# Patient Record
Sex: FEMALE | Race: WHITE | NOT HISPANIC OR LATINO | Employment: PART TIME | URBAN - METROPOLITAN AREA
[De-identification: names, ages, dates, MRNs, and addresses within clinical notes are randomized per-mention and may not be internally consistent; named-entity substitution may affect disease eponyms.]

---

## 2019-01-18 ENCOUNTER — HOSPITAL ENCOUNTER (OUTPATIENT)
Dept: MAMMOGRAPHY | Facility: CLINIC | Age: 49
Discharge: HOME | End: 2019-01-18
Attending: FAMILY MEDICINE | Admitting: FAMILY MEDICINE
Payer: COMMERCIAL

## 2019-01-18 DIAGNOSIS — N63.10 BREAST MASS, RIGHT: ICD-10-CM

## 2019-01-18 PROCEDURE — 76642 ULTRASOUND BREAST LIMITED: CPT | Mod: RT

## 2019-01-25 ENCOUNTER — HOSPITAL ENCOUNTER (OUTPATIENT)
Dept: MAMMOGRAPHY | Facility: CLINIC | Age: 49
Discharge: HOME | End: 2019-01-25
Attending: FAMILY MEDICINE | Admitting: FAMILY MEDICINE
Payer: COMMERCIAL

## 2019-01-25 DIAGNOSIS — N63.10 BREAST MASS, RIGHT: ICD-10-CM

## 2019-01-25 PROCEDURE — 88305 TISSUE EXAM BY PATHOLOGIST: CPT | Performed by: OBSTETRICS & GYNECOLOGY

## 2019-01-25 PROCEDURE — 27210206 US BREAST BIOPSY CORE NEEDLE RIGHT

## 2019-01-25 PROCEDURE — 88305 TISSUE EXAM BY PATHOLOGIST: CPT | Mod: 26 | Performed by: OBSTETRICS & GYNECOLOGY

## 2019-01-25 PROCEDURE — 25000125 ZZHC RX 250: Performed by: RADIOLOGY

## 2019-01-25 RX ADMIN — LIDOCAINE HYDROCHLORIDE 5 ML: 10 INJECTION, SOLUTION INFILTRATION; PERINEURAL at 11:24

## 2019-01-25 NOTE — DISCHARGE INSTRUCTIONS

## 2019-01-28 ENCOUNTER — TELEPHONE (OUTPATIENT)
Dept: MAMMOGRAPHY | Facility: CLINIC | Age: 49
End: 2019-01-28

## 2019-01-28 LAB — COPATH REPORT: NORMAL

## 2019-01-28 NOTE — PROGRESS NOTES
Tag Copy       Pathology report reviewed with our breast radiologist Dr. Chip Perla.     I phoned and informed patient of results showing benign lymph node.  Recommended follow up is Clinical Follow up.  I informed patient I would send this note to her primary provider/ordering physician- Dr. Carolina Sinha to inform.     Patient states no problems with biopsy site. Questions were answered and my phone number given if she has further questions or concerns.  Lyssa Elizalde RN, BSN

## 2019-01-28 NOTE — TELEPHONE ENCOUNTER
Pathology report reviewed with our breast radiologist Dr. Chip Perla.    I phoned and informed patient of results showing benign lymph node.  Recommended follow up is Clinical Follow up.  I informed patient I would send this note to her primary provider/ordering physician- Dr. Carolina Sinha to inform.    Patient states no problems with biopsy site. Questions were answered and my phone number given if she has further questions or concerns.  Lyssa Elizalde RN, BSN

## 2020-04-29 ENCOUNTER — HOSPITAL PATHOLOGY (OUTPATIENT)
Dept: OTHER | Facility: CLINIC | Age: 50
End: 2020-04-29

## 2020-04-30 LAB — COPATH REPORT: NORMAL

## 2020-07-20 DIAGNOSIS — Z11.59 ENCOUNTER FOR SCREENING FOR OTHER VIRAL DISEASES: Primary | ICD-10-CM

## 2020-10-25 DIAGNOSIS — Z11.59 ENCOUNTER FOR SCREENING FOR OTHER VIRAL DISEASES: ICD-10-CM

## 2020-10-25 PROCEDURE — U0003 INFECTIOUS AGENT DETECTION BY NUCLEIC ACID (DNA OR RNA); SEVERE ACUTE RESPIRATORY SYNDROME CORONAVIRUS 2 (SARS-COV-2) (CORONAVIRUS DISEASE [COVID-19]), AMPLIFIED PROBE TECHNIQUE, MAKING USE OF HIGH THROUGHPUT TECHNOLOGIES AS DESCRIBED BY CMS-2020-01-R: HCPCS | Performed by: COLON & RECTAL SURGERY

## 2020-10-26 LAB
SARS-COV-2 RNA SPEC QL NAA+PROBE: NOT DETECTED
SPECIMEN SOURCE: NORMAL

## 2020-10-28 ENCOUNTER — HOSPITAL ENCOUNTER (OUTPATIENT)
Facility: CLINIC | Age: 50
Discharge: HOME | End: 2020-10-28
Attending: COLON & RECTAL SURGERY | Admitting: COLON & RECTAL SURGERY
Payer: COMMERCIAL

## 2020-10-28 VITALS
TEMPERATURE: 98.1 F | DIASTOLIC BLOOD PRESSURE: 96 MMHG | WEIGHT: 135 LBS | OXYGEN SATURATION: 98 % | BODY MASS INDEX: 20.46 KG/M2 | HEART RATE: 62 BPM | HEIGHT: 68 IN | SYSTOLIC BLOOD PRESSURE: 130 MMHG | RESPIRATION RATE: 15 BRPM

## 2020-10-28 PROBLEM — H93.12 TINNITUS OF LEFT EAR: Status: ACTIVE | Noted: 2018-05-22

## 2020-10-28 LAB — COLONOSCOPY: NORMAL

## 2020-10-28 PROCEDURE — 45378 DIAGNOSTIC COLONOSCOPY: CPT | Performed by: COLON & RECTAL SURGERY

## 2020-10-28 PROCEDURE — G0105 COLORECTAL SCRN; HI RISK IND: HCPCS | Performed by: COLON & RECTAL SURGERY

## 2020-10-28 PROCEDURE — 250N000011 HC RX IP 250 OP 636: Performed by: COLON & RECTAL SURGERY

## 2020-10-28 PROCEDURE — G0500 MOD SEDAT ENDO SERVICE >5YRS: HCPCS | Performed by: COLON & RECTAL SURGERY

## 2020-10-28 RX ORDER — PROCHLORPERAZINE MALEATE 10 MG
10 TABLET ORAL EVERY 6 HOURS PRN
Status: DISCONTINUED | OUTPATIENT
Start: 2020-10-28 | End: 2020-10-28 | Stop reason: HOSPADM

## 2020-10-28 RX ORDER — FENTANYL CITRATE 50 UG/ML
INJECTION, SOLUTION INTRAMUSCULAR; INTRAVENOUS PRN
Status: DISCONTINUED | OUTPATIENT
Start: 2020-10-28 | End: 2020-10-28 | Stop reason: HOSPADM

## 2020-10-28 RX ORDER — ONDANSETRON 4 MG/1
4 TABLET, ORALLY DISINTEGRATING ORAL EVERY 6 HOURS PRN
Status: DISCONTINUED | OUTPATIENT
Start: 2020-10-28 | End: 2020-10-28 | Stop reason: HOSPADM

## 2020-10-28 RX ORDER — FLUMAZENIL 0.1 MG/ML
0.2 INJECTION, SOLUTION INTRAVENOUS
Status: DISCONTINUED | OUTPATIENT
Start: 2020-10-28 | End: 2020-10-28 | Stop reason: HOSPADM

## 2020-10-28 RX ORDER — ONDANSETRON 2 MG/ML
4 INJECTION INTRAMUSCULAR; INTRAVENOUS EVERY 6 HOURS PRN
Status: DISCONTINUED | OUTPATIENT
Start: 2020-10-28 | End: 2020-10-28 | Stop reason: HOSPADM

## 2020-10-28 RX ORDER — BUPROPION HYDROCHLORIDE 150 MG/1
150 TABLET ORAL EVERY MORNING
COMMUNITY

## 2020-10-28 RX ORDER — NALOXONE HYDROCHLORIDE 0.4 MG/ML
.1-.4 INJECTION, SOLUTION INTRAMUSCULAR; INTRAVENOUS; SUBCUTANEOUS
Status: DISCONTINUED | OUTPATIENT
Start: 2020-10-28 | End: 2020-10-28 | Stop reason: HOSPADM

## 2020-10-28 RX ORDER — ONDANSETRON 2 MG/ML
4 INJECTION INTRAMUSCULAR; INTRAVENOUS
Status: DISCONTINUED | OUTPATIENT
Start: 2020-10-28 | End: 2020-10-28 | Stop reason: HOSPADM

## 2020-10-28 RX ORDER — LIDOCAINE 40 MG/G
CREAM TOPICAL
Status: DISCONTINUED | OUTPATIENT
Start: 2020-10-28 | End: 2020-10-28 | Stop reason: HOSPADM

## 2020-10-28 ASSESSMENT — MIFFLIN-ST. JEOR: SCORE: 1285.86

## 2020-10-28 NOTE — BRIEF OP NOTE
[facility]    Brief Operative Note    Pre-operative diagnosis: Family history of malignant neoplasm [Z80.9]  Post-operative diagnosis normal colon    Procedure: Procedure(s):  COLONOSCOPY  Surgeon: Surgeon(s) and Role:     * Shy Franco MD - Primary  Anesthesia: Conscious Sedation   Estimated blood loss: None  Drains: None  Specimens: * No specimens in log *  Findings:   See Provation procedure note in Epic    Complications: None.  Implants: * No implants in log *

## 2020-10-28 NOTE — H&P
Pre-Endoscopy History and Physical     Lori Harris MRN# [id number]   YOB: 1970 Age: 50 year old     Date of Procedure: 10/28/2020  Primary care provider: Carolina Sinha  Type of Endoscopy: colonoscopy  Reason for Procedure: screening  Type of Anesthesia Anticipated: moderate sedation    HPI:    Lori is a 50 year old female who will be undergoing the above procedure.  Patient has noted smaller and more frequent stools lately. Denies bleeding.     A history and physical has been performed. The patient's medications and allergies have been reviewed. The risks and benefits of the procedure and the sedation options and risks were discussed with the patient.  All questions were answered and informed consent was obtained.      She denies a personal or family history of anesthesia complications or bleeding disorders.   Prior to Admission medications    Medication Sig Start Date End Date Taking? Authorizing Provider   buPROPion (WELLBUTRIN XL) 150 MG 24 hr tablet Take 150 mg by mouth every morning   Yes Reported, Patient   Cholecalciferol (VITAMIN D PO) Take 1 tablet by mouth daily.   Yes Reported, Patient   Cyanocobalamin (VITAMIN B 12 PO) Take 1 tablet by mouth daily.   Yes Reported, Patient   levothyroxine (SYNTHROID, LEVOTHROID) 75 MCG tablet Take 1 tablet by mouth daily.   Yes Reported, Patient   MULTIPLE VITAMIN PO Take 1 tablet by mouth daily.   Yes Reported, Patient   Naratriptan HCl (AMERGE PO) Take 1 mg by mouth every 4 hours as needed for migraine   Yes Reported, Patient       Allergies   Allergen Reactions     No Known Drug Allergy         Current Facility-Administered Medications   Medication     lidocaine (LMX4) cream     lidocaine 1 % 0.1-1 mL     ondansetron (ZOFRAN) injection 4 mg     sodium chloride (PF) 0.9% PF flush 3 mL     sodium chloride (PF) 0.9% PF flush 3 mL       Patient Active Problem List   Diagnosis     Transplant donor evaluation     Hypothyroidism     Acquired  absence of breast     Breast cancer (H)     Depressive disorder     FH: colon cancer     First degree atrioventricular block     Headache     Iliotibial band syndrome     Migraine without aura     Other extrapyramidal disease and abnormal movement disorder     Palpitations     Tinnitus of left ear     Seasonal affective disorder (H)     Persistent disorder of initiating or maintaining sleep        Past Medical History:   Diagnosis Date     Depressive disorder, not elsewhere classified      First degree atrioventricular block      Headaches      Hypothyroidism      Irregular heart beat      Malignant neoplasm of breast (female), unspecified site      Other disorder of muscle, ligament, and fascia      Persistent disorder of initiating or maintaining sleep         Past Surgical History:   Procedure Laterality Date     COLONOSCOPY N/A 9/23/2015    Procedure: COLONOSCOPY;  Surgeon: Shy Franco MD;  [facility]     DISSECT LYMPH NODE AXILLA Bilateral 8/25/2014    Procedure: DISSECT LYMPH NODE AXILLA;  Surgeon: Renetta Delatorre MD;  Location: [facility]     EXCISE CYST THYROGLOSSAL DUCT  1988     FLUORESCENCE INTRAOPERATIVE VASCULAR ANGIOGRAPHY Bilateral 8/25/2014    Procedure: FLUORESCENCE INTRAOPERATIVE VASCULAR ANGIOGRAPHY;  Surgeon: Jyoti Richardson MD;  Location: [facility]     GRAFT FAT TO BREAST N/A 1/6/2015    Procedure: GRAFT FAT TO BREAST;  Surgeon: Jyoti Richardson MD;  Location: [facility]     INSERT TISSUE EXPANDER BREAST BILATERAL Bilateral 8/25/2014    Procedure: INSERT TISSUE EXPANDER BREAST BILATERAL;  Surgeon: Jyoti Richardson MD;  Location: [facility]     MASTECTOMY, NIPPLE SPARING Bilateral 8/25/2014    Procedure: MASTECTOMY, NIPPLE SPARING;  Surgeon: Renetta Delatorre MD;  Location: [facility]     RECONSTRUCT BREAST BILATERAL, IMPLANT PROSTHESIS BILATERAL, COMBINED Bilateral 1/6/2015    Procedure: COMBINED RECONSTRUCT BREAST BILATERAL, IMPLANT PROSTHESIS BILATERAL;  Surgeon: Dylan  "Jyoti Guerra MD;  Location: [facility]       Social History     Tobacco Use     Smoking status: Never Smoker     Smokeless tobacco: Never Used   Substance Use Topics     Alcohol use: Yes     Comment: three to four drinks per week       Family History   Problem Relation Age of Onset     C.A.D. Father      Prostate Cancer Father      Breast Cancer Maternal Grandmother      Colon Cancer Mother        REVIEW OF SYSTEMS:     5 point ROS negative except as noted above in HPI, including Gen., Resp., CV, GI &  system review.      PHYSICAL EXAM:   BP (!) 140/100   Pulse 69   Temp 98.1  F (36.7  C) (Oral)   Resp 18   Ht 1.727 m (5' 8\")   Wt 61.2 kg (135 lb)   SpO2 98%   BMI 20.53 kg/m   Estimated body mass index is 20.53 kg/m  as calculated from the following:    Height as of this encounter: 1.727 m (5' 8\").    Weight as of this encounter: 61.2 kg (135 lb).   GENERAL APPEARANCE: healthy  MENTAL STATUS: alert  AIRWAY EXAM: Mallampatti Class II (visualization of the soft palate, fauces, and uvula)  RESP: lungs clear to auscultation - no rales, rhonchi or wheezes  CV: regular rates and rhythm      DIAGNOSTICS:    Not indicated      IMPRESSION   ASA Class 2 - Mild systemic disease        PLAN:     Colonoscopy with possible polypectomy, possible biopsy. The indications, procedure and risks were explained to the patient who agrees to proceed.       The above has been forwarded to the consulting provider.      Signed Electronically by: Shy Franco MD  October 28, 2020          "

## 2021-04-08 ENCOUNTER — IMMUNIZATION (OUTPATIENT)
Dept: LAB | Facility: CLINIC | Age: 51
End: 2021-04-08
Payer: COMMERCIAL

## 2021-05-09 ENCOUNTER — HEALTH MAINTENANCE LETTER (OUTPATIENT)
Age: 51
End: 2021-05-09

## 2021-10-24 ENCOUNTER — HEALTH MAINTENANCE LETTER (OUTPATIENT)
Age: 51
End: 2021-10-24

## 2021-10-27 ENCOUNTER — TRANSFERRED RECORDS (OUTPATIENT)
Dept: HEALTH INFORMATION MANAGEMENT | Facility: CLINIC | Age: 51
End: 2021-10-27

## 2022-05-06 ENCOUNTER — TELEPHONE (OUTPATIENT)
Dept: TRANSPLANT | Facility: CLINIC | Age: 52
End: 2022-05-06

## 2022-05-06 NOTE — TELEPHONE ENCOUNTER
"SSN: [id number]  Voucher: Opted-In Registered As: Family Voucher Donor  Donor Intake Start: 22 Donor Intake Complete: 5/3/22  Gender: Female Preferred Language: English  Full Name: Lori Harris Is  Needed: [not answered]  E-mail: [email or web address] Phone Number: [phone]  Secondary Phone:  Contact Preference: [not answered] Best Contact Time: 9am - 5pm  Emergency Contact: Kenneth Harris Emergency Contact #: [phone]  Relationship to Contact: Contact is my spouse  : 72 Age: 52  Address: [facility] [address] City: [place]  State: [place] Postal Code: [id number]  Height: 5'8\" Weight: 140lbs  BMI: 21.3  Employment Status: Employed Has PTO for donation? Yes, using vacation  Occupation:  Requires Heavy Lifting? No  Education Level: Four Year Degree Marital Status:   Exercise Routine: 4-6/Week Health Insurance: Yes  Blood Type: O Ethnicity/Race: White  Donor Type: Family Voucher Donor  Prefer Remote Donation: [not answered]  Physician: Dr Carolina Sinha<br/>RODRIGUEZ Hernandez  Motivation to donate: I ve have thought about it for a long time and recently heard a story about altruistic donation on  [facility] which renewed my desire for moving forward.  Living Donor Pre-Screening  Is In [place]  Will accept blood transfusions? Yes  Has been diagnosed with kidney disease? No  Has had a heart attack? No  Has Diabetes (High BGs)? No  Has had cancer? Yes  Type Remission  Breast Cancer 7 years  Has had kidney stones? No  Has ever been pregnant? Yes   - Is Currently Pregnant? No   - Months Since Pregnant? 24+   - Is Currently Nursing? No   - Had gestational diabetes? No   - Hypertension during pregnancy? Never  Is Planning on Pregnancy? No  Is Taking Birth Control? Yes   - Birth Control Months? 25   - BirthControlForm? IUD   - Birth Control Complications? No   - Is Able To Stop Birth Control? Yes  Has Used Tobacco? No  Has HIV? No  Is Currently Incarcerated? No  Is Currently Residing in [place] " Yes  History Misc  Has Allergies? No  Has had Surgeries? Yes  Surgery When  Bilateral mastectomy 2014  Breast reconstruction 2015  Thyroglossal duct cyst removal 1988  Takes Medication? Yes  Medication Dose Frequency  Diphenhydramine 25 mg Daily  Bupropion 150mg Daily  Ibuprofen, Naproxen As needed  Medical History  History of High BP? Never  History of CABG (bypass surgery)? No  History of blood clots? Never  History of coronary disease? Never  History of high cholesterol or triglycerides? Never  Has stents implanted? No  History of chest pain during exercise? No  History of chest pain at other times? No  Results of climbing 2 flights of stairs? No Problem  Had stress test in last year? No  Has had stroke? No  Has had leg bypass? No  History of lung disease? Never  History of COPD? Never  History of TB? Never  History of Pneumonia? Never  Has respiratory issues? No  Has HIV? No  Has Gastro Issues? No  History of Gallstones? Never  History of Pancreatitis? Never  History of Liver Disease? Never  History of Hepatitis B? Never  History of Hepatitis C? Never  History of bleeding problems? Never  History of UTIs? No  History of kidney damage? Never  History of Proteinuria? Never  History of Hematuria? Never  History of neuro disease? Never  History of seizure? Never  History of lupus? Never  History of paralysis? Never  History of arthritis? Never  History of neuropathy? Never  History of depression? Still being treated  History of anxiety? Never  History of documented psychiatric illness? Never  History of Fibroid Uterus? Never  History of Endometriosis? Never  History of Polycystic Ovaries? Never  Has had Miscarriages? No  Has had abortions? No  Has had transfusions? No  History of Obesity? No  History of Fabry's Disease? No  History of Sickle Cell Disease? No  History of Sickle Cell Trait? No  History of Sarcoidosis? No  Has auto-immune disease? No  Has had Physical Exam? Yes   - how many years ago: 1  Has had  Mammogram? Yes   - how many years ago: 7  Has had Pap Smear? Yes   - how many years ago: 1  Colonoscopy? Yes   - how many years ago: 2  Medical history comments? [no comments]  Living Donor Family Medical History  Anyone with kidney disease? No  Anyone with liver disease? Yes   - which family members: Mother  Anyone with heart disease? Yes   - which family members: Father, fraternal grandmother  Anyone with coronary artery disease? No  Anyone with high blood pressure? No  Anyone with blood disorder? No  Anyone with cancer? Yes   - which family members: Mother, maternal grandmother  Anyone with kidney cancer? No  Anyone with diabetes? No  Is mother alive? Yes  Mother's age? 79  Is father alive? Yes  Father's age? 79  How many siblings? 1  How many adult children? 2  How many children under 18? 0  Social History  Has Used Alcohol? Yes   - currently uses alcohol: Yes   - how much: 1/Daily  Has Abused Alcohol? No  Has Used Drugs? No  Has had legal issues w/ law enforcement? No  Traveled over 100 miles from home in last year? Yes   - Traveled Where? [place], [place]. [place]  Has had suicidal thoughts or attempts in the last five years? No

## 2022-05-09 ENCOUNTER — TELEPHONE (OUTPATIENT)
Dept: TRANSPLANT | Facility: CLINIC | Age: 52
End: 2022-05-09
Payer: COMMERCIAL

## 2022-05-09 NOTE — TELEPHONE ENCOUNTER
Initial Independent Living Donor Advocate contact made with potential donor today.  I introduced myself and my role during the donation process, includin.  REBA ROLE   The federal government requires that all licensed transplant centers provide the living donor with an Independent Living Donor Advocate (REBA).  I do not meet recipients or attend meetings that discuss their care or decision to transplant them. My role is separate to avoid any conflict of interest.  My role is to ensure:  1) your rights are protected;  2) you get all the information you need from the transplant team to make a fully informed decision whether to donate;   3) that living donation is in your best interest.   4) that you have the right to decide NOT to go forward with living donation at any time during this process.  I am available to you throughout the workup, during surgery phase and follow-up at home.   2. WORKUP & PRIVACY     Your identity and workup are not shared with the recipient at any time.     There is a medical donor workup that consists of testing to determine if you are healthy enough to donate.  Workup tests include many blood draws, urine collection/ (kidney function testing), chest x-ray, EKG, CT scan. As you complete each step then you may move on to the next.  Workup can take as little or as long as you need and you can stop the process at any time.     Transplant is a treatment option, not a cure. A kidney from a living kidney donor can last 12-14 years.  Other treatment options are  donation and two types of dialysis.     This is major surgery and your estimated hospital stay is approximately 1-2 nights.  After surgery, there are driving and lifting restrictions - no driving for two weeks and no lifting over ten pounds for 6 - 8 weeks.  Donors are routinely off from work for 4 - 6 weeks after surgery, and potentially longer if they have a physical job.       If you anticipate lost wages due to donation,  donor wage reimbursement options may be available to you and will be reviewed with you during the evaluation process.      The recipient's insurance covers the medical expenses related to the donor evaluation and surgery.  However, it is important for you to carry your own health insurance to address any medical issues that are found and are NOT related to living donation.  3.  QUESTIONS  Have you received a packet from the transplant department?     Questions?    Have you discussed with anyone your potential decision to donate?   Yes.  Is anyone pressuring or coercing you to donate? No.  Have you discussed any financial arrangements with recipient around donating a kidney? NO  Are you aware that you can confidentially opt out at any time, up to and including day of donation? Yes.  At this time, would you like to proceed with the medical evaluation to see if you can be a kidney donor?  Yes.    If yes, the donor coordinator will be reaching out to you with next steps.     You can reach me or someone else on the REBA team by calling [phone] Option 3.    REBA NOTES: She went through our process 9 years ago in 2013 and all the notes are in our system.  She ended up not donating at that time because her kids were still young and she and her  jointly decided to wait.  She wants to be a NDD.  Her coordinator is Soha Jackson RN.  I let her know that Soha will be reaching out in 2 to 3 days.    Duration of call 30 minutes

## 2022-05-10 ENCOUNTER — TELEPHONE (OUTPATIENT)
Dept: TRANSPLANT | Facility: CLINIC | Age: 52
End: 2022-05-10
Payer: COMMERCIAL

## 2022-05-13 ENCOUNTER — TELEPHONE (OUTPATIENT)
Dept: TRANSPLANT | Facility: CLINIC | Age: 52
End: 2022-05-13
Payer: COMMERCIAL

## 2022-05-13 DIAGNOSIS — Z00.5 EXAMINATION OF POTENTIAL DONOR OF ORGAN AND TISSUE: Primary | ICD-10-CM

## 2022-05-13 NOTE — TELEPHONE ENCOUNTER
I called Lori Soyzeeshan today to provide education on living kidney donation.  I introduced myself and described my role as the patient's Transplant Coordinator. The format for our call was via Teams meeting.  The patient has offered to be a nondirect donor.  The patient states she has been thinking about being a NDD for several years.  In fact she was evaluated at our center in 2013 but chose to wait due to her children and family needs.  She heard a [facility] story a few weeks ago that renewed her interest in donation.    Medical/Surgical History:  The patient states is a Non-Smoker.  Medical History includes:  1.  Ductal Carcinoma In -Situ Stage 0, 2014 with Bilateral mastectomy, and subsequent reconstruction surgery in 2015.  Only took tamoxifen for two months in the past.   2.  Hypothyroid- On Synthroid  3. Occasional migraines- takes Naratriptan.  She does not get full blown symptoms only head ache.  4.  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     5.  Takes Wellbutrin for mainly seasonal depression.      Complications from surgery: None, in fact she bounced back quickly following all her previous surgeries.  Requested routine cancer screening tests: She will be getting pap updated next week.  She had colonoscopy in 2015.     Infectious Disease Screening:  Reviewed any history of travel in endemic areas: No travel to these regions.  She is planning a trip to [place] this year.  Strongyloides- [place], [place] and [place].  Trypanosoma cruzi ([place])- [place]  West Nile Virus- [place], [place], [place], [place] and [place].      I asked the following questions regarding Covid-19:  Have you ever been positive for COVID 19? Yes January 2022 mild symptoms, cold symptoms.  No loss of taste/smell.     Have you received the COVID vaccination?Yes,  had booster shot.   I reviewed risk of COVID-19 to living donors.  I reviewed testing done for evaluation day. Patient stated understanding and  is in agreement to have infectious disease testing.      Lori is aware that she/he can stop donor evaluation at any time.     Per our Phase 1 algorithm patient meets criteria to do preliminary testing.  Orders placed in epic and copy sent to patient via [employer].  Patient has PCP appointment on Tuesday and will get labs and BP.  Patient states she believes she is often high BP in clinic visits as when she checks it at home it is lower.       Reviewed evaluation testing: Covid PCR, Iohexol, Lab work, CXR, EKG, Provider visits and functions, CT Angiogram.     Reviewed operations of selection committee and angio review meetings and the need for multidisciplinary input.     I reviewed Donor Shield program, and described Family Voucher program.  I provided patient with NKR website to review.  I recommended she watched the video of the Nondirect donor that started the record setting chain.  I plan to send the patient consent forms for the Family Voucher program.     I reviewed the entire Informed Consent for Living Kidney donation 3/2022 version.  I provided her and reviewed our most recent SRTR datasheet. I answered patient's questions.  A Docusign format Informed consent will be sent to her for final review and signature.   I reviewed all the ways a NDD can donate including running a matchrun and donating to someone on the waiting list at the [facility].  Another way to donate is by way of kidney paired exchange program.     Encouraged sign up for [facility] sign up. The patient stated she would like to proceed to evaluation.  Scheduling on Fridays is best in the summer she has Fridays off.  Donor timeline: End of October would work best for her surgery due to work schedule and trip to [place] planned this summer.  Patient stated understanding and agreement to the POC.  Soha Jackson RN  Living Donor Coordinator  05/13/2022 11:03 AM

## 2022-05-17 ENCOUNTER — TRANSFERRED RECORDS (OUTPATIENT)
Dept: HEALTH INFORMATION MANAGEMENT | Facility: CLINIC | Age: 52
End: 2022-05-17

## 2022-05-23 ENCOUNTER — TRANSFERRED RECORDS (OUTPATIENT)
Dept: HEALTH INFORMATION MANAGEMENT | Facility: CLINIC | Age: 52
End: 2022-05-23

## 2022-05-25 ENCOUNTER — DOCUMENTATION ONLY (OUTPATIENT)
Dept: TRANSPLANT | Facility: CLINIC | Age: 52
End: 2022-05-25
Payer: COMMERCIAL

## 2022-06-05 ENCOUNTER — HEALTH MAINTENANCE LETTER (OUTPATIENT)
Age: 52
End: 2022-06-05

## 2022-07-20 ENCOUNTER — TELEPHONE (OUTPATIENT)
Dept: TRANSPLANT | Facility: CLINIC | Age: 52
End: 2022-07-20

## 2022-07-22 ENCOUNTER — TELEPHONE (OUTPATIENT)
Dept: TRANSPLANT | Facility: CLINIC | Age: 52
End: 2022-07-22

## 2022-07-22 DIAGNOSIS — Z00.5 EXAMINATION OF POTENTIAL DONOR OF ORGAN AND TISSUE: Primary | ICD-10-CM

## 2022-07-22 DIAGNOSIS — Z00.5 TRANSPLANT DONOR EVALUATION: Primary | ICD-10-CM

## 2022-07-22 RX ORDER — ALBUTEROL SULFATE 0.83 MG/ML
2.5 SOLUTION RESPIRATORY (INHALATION)
Status: CANCELLED | OUTPATIENT
Start: 2022-08-26

## 2022-07-22 RX ORDER — ALBUTEROL SULFATE 90 UG/1
1-2 AEROSOL, METERED RESPIRATORY (INHALATION)
Status: CANCELLED
Start: 2022-08-26

## 2022-07-22 RX ORDER — METHYLPREDNISOLONE SODIUM SUCCINATE 125 MG/2ML
125 INJECTION, POWDER, LYOPHILIZED, FOR SOLUTION INTRAMUSCULAR; INTRAVENOUS
Status: CANCELLED
Start: 2022-08-26

## 2022-07-22 RX ORDER — MEPERIDINE HYDROCHLORIDE 25 MG/ML
25 INJECTION INTRAMUSCULAR; INTRAVENOUS; SUBCUTANEOUS EVERY 30 MIN PRN
Status: CANCELLED | OUTPATIENT
Start: 2022-08-26

## 2022-07-22 RX ORDER — DIPHENHYDRAMINE HYDROCHLORIDE 50 MG/ML
50 INJECTION INTRAMUSCULAR; INTRAVENOUS
Status: CANCELLED
Start: 2022-08-26

## 2022-07-22 RX ORDER — EPINEPHRINE 1 MG/ML
0.3 INJECTION, SOLUTION, CONCENTRATE INTRAVENOUS EVERY 5 MIN PRN
Status: CANCELLED | OUTPATIENT
Start: 2022-08-26

## 2022-08-25 LAB
ABO/RH(D): NORMAL
ANTIBODY SCREEN: NEGATIVE
SPECIMEN EXPIRATION DATE: NORMAL

## 2022-08-26 ENCOUNTER — OFFICE VISIT (OUTPATIENT)
Dept: NEPHROLOGY | Facility: CLINIC | Age: 52
End: 2022-08-26
Attending: INTERNAL MEDICINE

## 2022-08-26 ENCOUNTER — ANCILLARY PROCEDURE (OUTPATIENT)
Dept: CARDIOLOGY | Facility: CLINIC | Age: 52
End: 2022-08-26
Attending: INTERNAL MEDICINE

## 2022-08-26 ENCOUNTER — ANCILLARY PROCEDURE (OUTPATIENT)
Dept: CT IMAGING | Facility: CLINIC | Age: 52
End: 2022-08-26
Attending: INTERNAL MEDICINE

## 2022-08-26 ENCOUNTER — OFFICE VISIT (OUTPATIENT)
Dept: TRANSPLANT | Facility: CLINIC | Age: 52
End: 2022-08-26
Attending: INTERNAL MEDICINE

## 2022-08-26 ENCOUNTER — ANCILLARY PROCEDURE (OUTPATIENT)
Dept: GENERAL RADIOLOGY | Facility: CLINIC | Age: 52
End: 2022-08-26
Attending: INTERNAL MEDICINE

## 2022-08-26 ENCOUNTER — ALLIED HEALTH/NURSE VISIT (OUTPATIENT)
Dept: TRANSPLANT | Facility: CLINIC | Age: 52
End: 2022-08-26
Attending: INTERNAL MEDICINE

## 2022-08-26 ENCOUNTER — LAB (OUTPATIENT)
Dept: LAB | Facility: CLINIC | Age: 52
End: 2022-08-26
Attending: INTERNAL MEDICINE

## 2022-08-26 ENCOUNTER — APPOINTMENT (OUTPATIENT)
Dept: TRANSPLANT | Facility: CLINIC | Age: 52
End: 2022-08-26
Attending: INTERNAL MEDICINE

## 2022-08-26 ENCOUNTER — ALLIED HEALTH/NURSE VISIT (OUTPATIENT)
Dept: TRANSPLANT | Facility: CLINIC | Age: 52
End: 2022-08-26

## 2022-08-26 ENCOUNTER — OFFICE VISIT (OUTPATIENT)
Dept: INFUSION THERAPY | Facility: CLINIC | Age: 52
End: 2022-08-26
Attending: INTERNAL MEDICINE

## 2022-08-26 VITALS
DIASTOLIC BLOOD PRESSURE: 98 MMHG | OXYGEN SATURATION: 99 % | BODY MASS INDEX: 21.22 KG/M2 | HEIGHT: 68 IN | SYSTOLIC BLOOD PRESSURE: 163 MMHG | HEART RATE: 66 BPM | WEIGHT: 140 LBS

## 2022-08-26 VITALS
OXYGEN SATURATION: 99 % | SYSTOLIC BLOOD PRESSURE: 144 MMHG | BODY MASS INDEX: 21.88 KG/M2 | RESPIRATION RATE: 16 BRPM | WEIGHT: 143.9 LBS | TEMPERATURE: 98.1 F | HEART RATE: 65 BPM | DIASTOLIC BLOOD PRESSURE: 95 MMHG

## 2022-08-26 DIAGNOSIS — Z00.5 TRANSPLANT DONOR EVALUATION: ICD-10-CM

## 2022-08-26 DIAGNOSIS — Z00.5 TRANSPLANT DONOR EVALUATION: Primary | ICD-10-CM

## 2022-08-26 DIAGNOSIS — Z00.5 EXAMINATION OF POTENTIAL DONOR OF ORGAN AND TISSUE: ICD-10-CM

## 2022-08-26 LAB
ABO/RH(D): NORMAL
ALBUMIN SERPL-MCNC: 3.9 G/DL (ref 3.4–5)
ALBUMIN UR-MCNC: NEGATIVE MG/DL
ALP SERPL-CCNC: 48 U/L (ref 40–150)
ALT SERPL W P-5'-P-CCNC: 13 U/L (ref 0–50)
ANION GAP SERPL CALCULATED.3IONS-SCNC: 2 MMOL/L (ref 3–14)
APPEARANCE UR: CLEAR
APTT PPP: 27 SECONDS (ref 22–38)
AST SERPL W P-5'-P-CCNC: 14 U/L (ref 0–45)
B-HCG SERPL-ACNC: <1 IU/L (ref 0–5)
BILIRUB SERPL-MCNC: 0.7 MG/DL (ref 0.2–1.3)
BILIRUB UR QL STRIP: NEGATIVE
BUN SERPL-MCNC: 10 MG/DL (ref 7–30)
CALCIUM SERPL-MCNC: 8.8 MG/DL (ref 8.5–10.1)
CHLORIDE BLD-SCNC: 106 MMOL/L (ref 94–109)
CHOLEST SERPL-MCNC: 195 MG/DL
CMV IGG SERPL IA-ACNC: 9.5 U/ML
CMV IGG SERPL IA-ACNC: ABNORMAL
CO2 SERPL-SCNC: 28 MMOL/L (ref 20–32)
COLOR UR AUTO: YELLOW
CREAT SERPL-MCNC: 0.86 MG/DL (ref 0.52–1.04)
CREAT UR-MCNC: 97 MG/DL
CREAT UR-MCNC: 97 MG/DL
EBV VCA IGG SER IA-ACNC: 554 U/ML
EBV VCA IGG SER IA-ACNC: POSITIVE
EBV VCA IGM SER IA-ACNC: 14.9 U/ML
EBV VCA IGM SER IA-ACNC: NORMAL
ERYTHROCYTE [DISTWIDTH] IN BLOOD BY AUTOMATED COUNT: 13.2 % (ref 10–15)
FASTING STATUS PATIENT QL REPORTED: YES
GFR SERPL CREATININE-BSD FRML MDRD: 81 ML/MIN/1.73M2
GLUCOSE BLD-MCNC: 91 MG/DL (ref 70–99)
GLUCOSE UR STRIP-MCNC: NEGATIVE MG/DL
HBA1C MFR BLD: 5.2 % (ref 0–5.6)
HBV CORE AB SERPL QL IA: NONREACTIVE
HBV SURFACE AB SERPL IA-ACNC: 93.08 M[IU]/ML
HBV SURFACE AB SERPL IA-ACNC: REACTIVE M[IU]/ML
HBV SURFACE AG SERPL QL IA: NONREACTIVE
HCT VFR BLD AUTO: 41.7 % (ref 35–47)
HCV AB SERPL QL IA: NONREACTIVE
HDLC SERPL-MCNC: 85 MG/DL
HGB BLD-MCNC: 14 G/DL (ref 11.7–15.7)
HGB UR QL STRIP: ABNORMAL
HIV 1+2 AB+HIV1 P24 AG SERPL QL IA: NONREACTIVE
INR PPP: 1.01 (ref 0.85–1.15)
KETONES UR STRIP-MCNC: NEGATIVE MG/DL
LDLC SERPL CALC-MCNC: 94 MG/DL
LEUKOCYTE ESTERASE UR QL STRIP: NEGATIVE
LVEF ECHO: NORMAL
MCH RBC QN AUTO: 29.7 PG (ref 26.5–33)
MCHC RBC AUTO-ENTMCNC: 33.6 G/DL (ref 31.5–36.5)
MCV RBC AUTO: 89 FL (ref 78–100)
MICROALBUMIN UR-MCNC: 7 MG/L
MICROALBUMIN/CREAT UR: 7.22 MG/G CR (ref 0–25)
NITRATE UR QL: NEGATIVE
NONHDLC SERPL-MCNC: 110 MG/DL
PH UR STRIP: 6 [PH] (ref 5–7)
PHOSPHATE SERPL-MCNC: 3.6 MG/DL (ref 2.5–4.5)
PLATELET # BLD AUTO: 295 10E3/UL (ref 150–450)
POTASSIUM BLD-SCNC: 3.5 MMOL/L (ref 3.4–5.3)
PROT SERPL-MCNC: 7.2 G/DL (ref 6.8–8.8)
PROT UR-MCNC: <0.05 G/L
PROT/CREAT 24H UR: NORMAL MG/G{CREAT}
RBC # BLD AUTO: 4.71 10E6/UL (ref 3.8–5.2)
RBC URINE: <1 /HPF
SODIUM SERPL-SCNC: 136 MMOL/L (ref 133–144)
SP GR UR STRIP: 1.02 (ref 1–1.03)
SPECIMEN EXPIRATION DATE: NORMAL
SQUAMOUS EPITHELIAL: 1 /HPF
T PALLIDUM AB SER QL: NONREACTIVE
TRIGL SERPL-MCNC: 79 MG/DL
URATE SERPL-MCNC: 3.2 MG/DL (ref 2.6–6)
UROBILINOGEN UR STRIP-MCNC: NORMAL MG/DL
WBC # BLD AUTO: 4.3 10E3/UL (ref 4–11)
WBC URINE: 1 /HPF

## 2022-08-26 PROCEDURE — 36415 COLL VENOUS BLD VENIPUNCTURE: CPT

## 2022-08-26 PROCEDURE — 86644 CMV ANTIBODY: CPT

## 2022-08-26 PROCEDURE — 80053 COMPREHEN METABOLIC PANEL: CPT

## 2022-08-26 PROCEDURE — 86850 RBC ANTIBODY SCREEN: CPT

## 2022-08-26 PROCEDURE — 86665 EPSTEIN-BARR CAPSID VCA: CPT

## 2022-08-26 PROCEDURE — 81001 URINALYSIS AUTO W/SCOPE: CPT

## 2022-08-26 PROCEDURE — 36415 COLL VENOUS BLD VENIPUNCTURE: CPT | Performed by: SURGERY

## 2022-08-26 PROCEDURE — 86481 TB AG RESPONSE T-CELL SUSP: CPT

## 2022-08-26 PROCEDURE — 74175 CTA ABDOMEN W/CONTRAST: CPT | Mod: GC | Performed by: RADIOLOGY

## 2022-08-26 PROCEDURE — 36591 DRAW BLOOD OFF VENOUS DEVICE: CPT | Performed by: SURGERY

## 2022-08-26 PROCEDURE — 84550 ASSAY OF BLOOD/URIC ACID: CPT

## 2022-08-26 PROCEDURE — 85027 COMPLETE CBC AUTOMATED: CPT

## 2022-08-26 PROCEDURE — 71046 X-RAY EXAM CHEST 2 VIEWS: CPT | Performed by: RADIOLOGY

## 2022-08-26 PROCEDURE — 86682 HELMINTH ANTIBODY: CPT

## 2022-08-26 PROCEDURE — 83036 HEMOGLOBIN GLYCOSYLATED A1C: CPT

## 2022-08-26 PROCEDURE — 99207 PR STATISTIC IV PUSH SINGLE INITIAL SUBSTANCE: CPT | Performed by: INTERNAL MEDICINE

## 2022-08-26 PROCEDURE — 82043 UR ALBUMIN QUANTITATIVE: CPT

## 2022-08-26 PROCEDURE — 86780 TREPONEMA PALLIDUM: CPT

## 2022-08-26 PROCEDURE — 86704 HEP B CORE ANTIBODY TOTAL: CPT

## 2022-08-26 PROCEDURE — 99205 OFFICE O/P NEW HI 60 MIN: CPT | Performed by: INTERNAL MEDICINE

## 2022-08-26 PROCEDURE — 86706 HEP B SURFACE ANTIBODY: CPT

## 2022-08-26 PROCEDURE — 86803 HEPATITIS C AB TEST: CPT

## 2022-08-26 PROCEDURE — 86753 PROTOZOA ANTIBODY NOS: CPT

## 2022-08-26 PROCEDURE — 84156 ASSAY OF PROTEIN URINE: CPT

## 2022-08-26 PROCEDURE — 85730 THROMBOPLASTIN TIME PARTIAL: CPT

## 2022-08-26 PROCEDURE — 82542 COL CHROMOTOGRAPHY QUAL/QUAN: CPT | Performed by: SURGERY

## 2022-08-26 PROCEDURE — 99205 OFFICE O/P NEW HI 60 MIN: CPT | Performed by: SURGERY

## 2022-08-26 PROCEDURE — 86789 WEST NILE VIRUS ANTIBODY: CPT

## 2022-08-26 PROCEDURE — 85610 PROTHROMBIN TIME: CPT

## 2022-08-26 PROCEDURE — 99207 PR NO CHARGE COORDINATED CARE PS: CPT

## 2022-08-26 PROCEDURE — 84100 ASSAY OF PHOSPHORUS: CPT

## 2022-08-26 PROCEDURE — 255N000002 HC RX 255 OP 636: Performed by: INTERNAL MEDICINE

## 2022-08-26 PROCEDURE — 84702 CHORIONIC GONADOTROPIN TEST: CPT

## 2022-08-26 PROCEDURE — 93306 TTE W/DOPPLER COMPLETE: CPT | Performed by: INTERNAL MEDICINE

## 2022-08-26 PROCEDURE — 87340 HEPATITIS B SURFACE AG IA: CPT

## 2022-08-26 PROCEDURE — 80061 LIPID PANEL: CPT

## 2022-08-26 RX ORDER — ALBUTEROL SULFATE 90 UG/1
1-2 AEROSOL, METERED RESPIRATORY (INHALATION)
Status: CANCELLED
Start: 2022-08-26

## 2022-08-26 RX ORDER — METHYLPREDNISOLONE SODIUM SUCCINATE 125 MG/2ML
125 INJECTION, POWDER, LYOPHILIZED, FOR SOLUTION INTRAMUSCULAR; INTRAVENOUS
Status: CANCELLED
Start: 2022-08-26

## 2022-08-26 RX ORDER — IOPAMIDOL 755 MG/ML
90 INJECTION, SOLUTION INTRAVASCULAR ONCE
Status: COMPLETED | OUTPATIENT
Start: 2022-08-26 | End: 2022-08-26

## 2022-08-26 RX ORDER — DIPHENHYDRAMINE HYDROCHLORIDE 50 MG/ML
50 INJECTION INTRAMUSCULAR; INTRAVENOUS
Status: CANCELLED
Start: 2022-08-26

## 2022-08-26 RX ORDER — MEPERIDINE HYDROCHLORIDE 25 MG/ML
25 INJECTION INTRAMUSCULAR; INTRAVENOUS; SUBCUTANEOUS EVERY 30 MIN PRN
Status: CANCELLED | OUTPATIENT
Start: 2022-08-26

## 2022-08-26 RX ORDER — EPINEPHRINE 1 MG/ML
0.3 INJECTION, SOLUTION INTRAMUSCULAR; SUBCUTANEOUS EVERY 5 MIN PRN
Status: CANCELLED | OUTPATIENT
Start: 2022-08-26

## 2022-08-26 RX ORDER — ALBUTEROL SULFATE 0.83 MG/ML
2.5 SOLUTION RESPIRATORY (INHALATION)
Status: CANCELLED | OUTPATIENT
Start: 2022-08-26

## 2022-08-26 RX ADMIN — IOPAMIDOL 90 ML: 755 INJECTION, SOLUTION INTRAVASCULAR at 14:28

## 2022-08-26 RX ADMIN — IOHEXOL 4 ML: 350 INJECTION, SOLUTION INTRAVENOUS at 07:28

## 2022-08-26 RX ADMIN — Medication 5 ML: at 15:10

## 2022-08-26 ASSESSMENT — PAIN SCALES - GENERAL: PAINLEVEL: NO PAIN (0)

## 2022-08-26 NOTE — PROGRESS NOTES
[facility] Solid Organ Transplant  Outpatient MNT: Kidney Donor Evaluation    Current BMI: 21.2 (HT 68 in,  lbs/64 kg)  BMI is within recommendation of <30 for kidney donation    8 Year Estimated Risk of T2DM  </= 3%     Time Spent: 15 minutes  Visit Type: Initial  Referring Physician: Ar  Pt accompanied by: self     Nutrition Assessment  Vitamins, Supplements, Pertinent Meds: vit D, iron   Herbal Medicines/Supplements: none     Weight hx: stable     Food Security: any concerns about having enough money to buy food or access to grocery stores? No     Diet Recall  Breakfast Peanut butter bagel, yogurt w/ fruit, avocado toast   Lunch Leftovers, pasta, frozen meals    Dinner Every Plate box- pasta w/ veggies & cheesy sauce; bahn mi bowl w/ beef + veggies   Snacks Cheetos, some gardettos, pretzels, saltines    Beverages 1-2 Diet Coke/day, water   Alcohol 8-9 drinks/week (wine or cocktails)   Dining out 1x/month      Physical Activity  6 days/week- you tube videos, yoga, cardio      Labs  Recent Labs   Lab Test 08/26/22  0657   CHOL 195   HDL 85   LDL 94   TRIG 79       FBG = 91  A1c = 5.2  BP = high x 3    Prediction of Incident Diabetes Mellitus in Middle-aged Adults: The [place] Offspring Study  Daniel Lin MD; James B. Meigs, MD, MPH; Nadege Alvarez, PhD; Maureen Gonzalez MD, MPH; Nito Eaton MD; Romero Burgos Sr,   PhD  Pt's estimated risk for T2DM (per Table 6 above)  Pt received points for the following criteria: high BP today   Total points: 2  8-Year estimated risk of T2DM: </= 3%    Nutrition Diagnosis  No nutrition diagnosis identified at this time.    Nutrition Intervention  Nutrition education provided:  Reviewed overall healthy diet guidelines for pre and post kidney donation. Discussed maintenance of a healthy weight and Na+ intake <3000 mg/day (<2000 mg/day if HTN).    Avoid the following post op d/t unknown effects on the organs:  - Herbal, Chinese, holistic,  pt medicated per [facility], natural, alternative medicines and supplements  - Detoxes and cleanses  - Weight loss pills  - Protein powders or other products with extracts or herbs (ie green tea extract)    Patient Understanding: Pt verbalized understanding of education provided.  Expected Engagement: Good  Follow-Up Plans: PRN     Nutrition Goals  No nutrition goals identified at this time     Yovana Louis, RD, LD, CCTD

## 2022-08-26 NOTE — PROGRESS NOTES
"Chief Complaint   Patient presents with     Injections     Iohexal     Iohexol Timed Test Nursing Note    Patient comes to [facility] today for a Iohexol GFR Timed test.   Orders from Dr. Asencio were completed.    Progress Note  Height: 5'8\"  Weight: 140 lb  Age: 52  Gender: Female    The following information was verified with the patient:  *Female patients are not pregnant or could not have become recently pregnant: No  *Is there a history of allergy (skin rash, swelling, ect) to :   a. Iodine (except skin reactions to Betadine): NO   b. Intravenous radio-contrast agents: NO   c. Seafood: Patient reports possible rash from scallops. Confirmed with pharmacy she has had iohexal contrast in past and patient reports no previous issues.      RN provided patient with educational handout regarding timed test. YES    Medication administered :   Iohexol (Omnipaque 300 mg Iodine/ ml concentration) 5 mls.  Site administered Left AC  Start time 0728  Stop time 0730    Blood draws were taken from Right AC in lab.  Baseline (Time 0) 0730: the time immediately after the injection is completed.  (2 Hour) 0930  (4 hour) 1130    Patient tolerated the procedure well    Vitals were reviewed       Discharge Plan  Discharge instructions reviewed with patient: YES  Discharge papers printed and given to patient: YES  Patient/Representative verbalized understanding, all questions answered: YES    Discharged from unit at 0735 with whom: self to Transplant Clinic.    Vital signs:  Temp: 98.1  F (36.7  C) Temp src: Oral BP: (!) 144/95 Pulse: 65   Resp: 16 SpO2: 99 %       Weight: 65.3 kg (143 lb 14.4 oz)  Estimated body mass index is 21.88 kg/m  as calculated from the following:    Height as of 10/28/20: 1.727 m (5' 8\").    Weight as of this encounter: 65.3 kg (143 lb 14.4 oz).        "

## 2022-08-26 NOTE — LETTER
8/26/2022       RE: Lori Harris  [address] [facility] [place] [id number]     Dear Colleague,    Thank you for referring your patient, Lori Harris, to the [facility] [place] at [facility]. Please see a copy of my visit note below.    TRANSPLANT NEPHROLOGY DONOR EVALUATION    Assessment and Plan:  # Prospective Kidney Transplant Donor: Patient with a couple of issues that need to be addressed prior to donation. Patient's blood pressure is elevated at this visit, kidney function appears to be acceptable with Iohexol pending, and urinalysis is bland.    # Elevated Blood Pressure: Patient has elevated blood pressure today, but also reports recent high blood pressure when she tried to donate blood and she was denied in doing so.  Recommend 24 hour ambulatory blood pressure monitor.    # Abnormal EKG: Patient had 1st degree AV block with biatrial enlargement and nonspecific ST and T wave abnormalities.  Cardiac echo was unremarkable with normal LVEF ~ 60-65% and no evidence of LVH or diastolic dysfunction.    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  She underwent bilateral mastectomy.  No issues since.    Discussed the risks of donating a kidney, including the surgical risk and the possible risks of living with one kidney.    Education about expected post-donation kidney function and how chronic kidney disease (CKD) and end stage kidney disease (ESKD) might potentially impact the donor in the future, include, but not limited to:       - On average, donors will have 25-35% permanent loss of kidney function at donation.       - Baseline risk of ESKD may slightly exceed that of members of the general population with the same demographic profile.       - Donor risks must be interpreted in light of known epidemiology of both CKD or ESKD, such as that CKD generally  develops in midlife (40-50 years old) and ESKD generally develops after age 60.       - Medical evaluation of young potential donors cannot predict lifetime risk of CKD or ESKD.       - Donors may be at higher risk for CKD if they sustain damage to the remaining kidney.       - Development of CKD and progression of ESKD may be more rapid with only 1 kidney.       - Some type of kidney replacement therapy, either kidney transplant or dialysis, is required when reaching ESKD.    Potential medical or surgical risks include, but not limited to:       - Death.       - Scars, pain, fatigue, and other consequences typical of any surgical procedure.       - Decreased kidney function.       - Abdominal or bowel symptoms, such as bloating and nausea, and developing bowel obstruction.       - Kidney failure (ESKD) and the need for a kidney transplant or dialysis for the donor.       - Impact of obesity, hypertension, or other donor-specific medical conditions on morbidity and mortality of the potential donor.    Patients overall evaluation will be discussed with the transplant team and a final recommendation on the patients' suitability to be a kidney transplant donor will be made at that time.    Consult:  Lori Harris was seen in consultation at the request of Dr. Dulce Joyner for evaluation as a potential kidney transplant donor.    Reason for Visit:  Lori Harris is a 52 year old female who presents for a kidney donor evaluation.  Patient would like to be a non-directed donor.    Present Condition and Donor-Related Medical History:    Patient was previously evaluated as a potential kidney donor 4/19/13 and eventually was approved.  However, prior to donating, she developed breast cancer and everything was stopped.  Patient was diagnosed with right ductal in situ cancer, stage 0 in 8/2014.  She underwent bilateral mastectomy and subsequent breast reconstruction.  No issues since.    Energy level is  good and has been normal.  She is active and does get some exercise.  Denies any chest pain or shortness of breath with exertion.  Appetite is good and no recent weight change.  No nausea, vomiting or diarrhea.  No fever, sweats or chills.  No leg swelling.  No pain or burning with urination.         Kidney Disease Hx:       h/o Kidney Problems: No  Family h/o Genetic Kidney Disease: No       h/o Hypertension: Possible   Usual Blood Pressure: Recent BP was ~ 150/100 while trying to donate blood       h/o Protein in Urine: No  h/o Blood in Urine: No       h/o Kidney Stones: No  h/o Kidney Injury: No       h/o Recurrent UTI: No  h/o Genitourinary Problems: No       h/o Chronic NSAID Use: No         Other Medical Hx:       h/o Diabetes: No             h/o Gastrointestinal, Pancreas or Liver Problems: No       h/o Lung or Heart Problems: No       h/o Hematologic Problems: No  h/o Bleeding or Clotting Problems: No       h/o Cancer: Yes: Breast Cancer       h/o Infection Problems: No       h/o Gestational DM: No      h/o Preeclampsia: No         Skin Cancer Risk:       h/o more than 50 moles: Yes; Follows with Dermatology       h/o extensive sun exposure: No       h/o melanoma: No       Family h/o melanoma: No         Mental Health Assessment:       h/o Depression: Yes: No hospitalizations.  Not following with mental health.       h/o Psychiatric Illness: No       h/o Suicidal Attempt(s): No    COVID Status:  Vaccination Up To Date: Yes  H/o COVID Infection: Yes; 1/2022 with no residual symptoms.    Review Of Systems:   A comprehensive review of systems was obtained and negative, except as noted in the HPI or PMH.    Past Medical History:   History was taken from the patient as noted below.  Past Medical History:   Diagnosis Date     Carcinoma in situ of right breast 08/2014    Ductal in situ; Stage 0     Depressive disorder, not elsewhere classified      First degree atrioventricular block      Headaches       Hypothyroidism      Irregular heart beat      Other disorder of muscle, ligament, and fascia      Persistent disorder of initiating or maintaining sleep      Vitamin D deficiency        Past Social History:   Past Surgical History:   Procedure Laterality Date     COLONOSCOPY N/A 9/23/2015    Procedure: COLONOSCOPY;  Surgeon: Shy Franco MD;  Location:  GI     COLONOSCOPY N/A 10/28/2020    Procedure: COLONOSCOPY;  Surgeon: Shy Franco MD;  Location:  GI     DISSECT LYMPH NODE AXILLA Bilateral 8/25/2014    Procedure: DISSECT LYMPH NODE AXILLA;  Surgeon: Renetta Delatorre MD;  Location: [facility]     EXCISE CYST THYROGLOSSAL DUCT  1988     FLUORESCENCE INTRAOPERATIVE VASCULAR ANGIOGRAPHY Bilateral 8/25/2014    Procedure: FLUORESCENCE INTRAOPERATIVE VASCULAR ANGIOGRAPHY;  Surgeon: Jyoti Richardson MD;  Location: [facility]     GRAFT FAT TO BREAST N/A 1/6/2015    Procedure: GRAFT FAT TO BREAST;  Surgeon: Jyoti Richardson MD;  Location: [facility]     INSERT TISSUE EXPANDER BREAST BILATERAL Bilateral 8/25/2014    Procedure: INSERT TISSUE EXPANDER BREAST BILATERAL;  Surgeon: Jyoti Richardson MD;  Location: [facility]     MASTECTOMY, NIPPLE SPARING Bilateral 8/25/2014    Procedure: MASTECTOMY, NIPPLE SPARING;  Surgeon: Renetta Delatorre MD;  Location: [facility]     RECONSTRUCT BREAST BILATERAL, IMPLANT PROSTHESIS BILATERAL, COMBINED Bilateral 1/6/2015    Procedure: COMBINED RECONSTRUCT BREAST BILATERAL, IMPLANT PROSTHESIS BILATERAL;  Surgeon: Jyoti Richardson MD;  Location: [facility]     Personal or family history of anesthesia problems: No    Family History:   Family History   Problem Relation Age of Onset     Colon Cancer Mother      Skin Cancer Mother      Coronary Artery Disease Father         MI at age 55     Prostate Cancer Father      Hypertension Father      Breast Cancer Maternal Grandmother      Coronary Artery Disease Paternal Grandmother      Kidney Disease No family hx of       Diabetes No family hx of           Specific Family History in First Degree Relatives:       FH of Kidney Dz: No FH of Diabetes: No       FH of Hypertension: Yes   FH of CAD: Yes        FH of Cancer: Yes   FH of Kidney Cancer: No    Personal History:   Social History     Socioeconomic History     Marital status:      Spouse name: Not on file     Number of children: 2     Years of education: Not on file     Highest education level: Not on file   Occupational History     Not on file   Tobacco Use     Smoking status: Never Smoker     Smokeless tobacco: Never Used   Substance and Sexual Activity     Alcohol use: Yes     Alcohol/week: 9.0 standard drinks     Types: 9 Standard drinks or equivalent per week     Drug use: No     Sexual activity: Yes     Partners: Male   Other Topics Concern     Parent/sibling w/ CABG, MI or angioplasty before 65F 55M? Not Asked   Social History Narrative     Not on file     Social Determinants of Health     Financial Resource Strain: Not on file   Food Insecurity: Not on file   Transportation Needs: Not on file   Physical Activity: Not on file   Stress: Not on file   Social Connections: Not on file   Intimate Partner Violence: Not on file   Housing Stability: Not on file          Specific Social History:       Health Insurance Status: Yes       Employment Status: Full time  Occupation:                        Living Arrangements: lives with their spouse       Social Support: Yes       Presence of increased risk for disease transmission behaviors as defined by PHS guidelines: No        Allergies:  Allergies   Allergen Reactions     No Known Drug Allergy        Medications:  Current Outpatient Medications   Medication Sig     buPROPion (WELLBUTRIN XL) 150 MG 24 hr tablet Take 150 mg by mouth every morning     Cholecalciferol (VITAMIN D PO) Take 1 tablet by mouth daily.     levothyroxine (SYNTHROID, LEVOTHROID) 75 MCG tablet Take 1 tablet by mouth daily.     Naratriptan HCl  (AMERGE PO) Take 1 mg by mouth every 4 hours as needed for migraine     No current facility-administered medications for this visit.     Medications Discontinued During This Encounter   Medication Reason     Cyanocobalamin (VITAMIN B 12 PO)      MULTIPLE VITAMIN PO          Vitals:  Vital Signs 8/26/2022 8/26/2022 8/26/2022   Systolic 164 155 163   Diastolic 101 111 98   Pulse - - -   Temperature - - -   Respirations - - -   Weight (LB) - - -   Height - - -   BMI (Calculated) - - -   Pain Score - - -   O2 - - -       Exam:   GENERAL APPEARANCE: alert and no distress  HENT: mouth without ulcers or lesions  LYMPHATICS: no cervical or supraclavicular nodes  RESP: lungs clear to auscultation - no rales, rhonchi or wheezes  CV: regular rhythm, normal rate, no rub, no murmur  EDEMA: no LE edema bilaterally  ABDOMEN: soft, nondistended, nontender, bowel sounds normal  MS: extremities normal - no gross deformities noted, no evidence of inflammation in joints, no muscle tenderness  SKIN: no rash    Results:   Labs and imaging were ordered for this visit and reviewed by me.  Recent Results (from the past 336 hour(s))   HCG quantitative pregnancy    Collection Time: 08/26/22  6:57 AM   Result Value Ref Range    hCG Quantitative <1 0 - 5 IU/L   EBV Capsid Antibody IgM    Collection Time: 08/26/22  6:57 AM   Result Value Ref Range    EBV Capsid Hanh IgM Instrument Value 14.9 <36.0 U/mL    EBV Capsid Antibody IgM No detectable antibody. No detectable antibody.   EBV Capsid Antibody IgG    Collection Time: 08/26/22  6:57 AM   Result Value Ref Range    EBV Capsid Hanh IgG Instrument Value 554.0 (H) <18.0 U/mL    EBV Capsid Antibody IgG Positive (A) No detectable antibody.   CMV Antibody IgG    Collection Time: 08/26/22  6:57 AM   Result Value Ref Range    CMV Hanh IgG Instrument Value 9.50 (H) <0.60 U/mL    CMV Antibody IgG Positive, suggests recent or past exposure. (A) No detectable antibody.    Trypanosoma Cruzi    Collection Time:  08/26/22  6:57 AM   Result Value Ref Range    Trypanosoma Cruzi Non-Reactive Non-Reactive   Strongyloides antibody IgG    Collection Time: 08/26/22  6:57 AM   Result Value Ref Range    Strongyloides Hanh IgG 0.7 <=0.9 IV   West Nile Virus Antibody IgG IgM    Collection Time: 08/26/22  6:57 AM   Result Value Ref Range    West Nile IgG Serum 0.29 <=1.29 IV    West Nile IgM Serum 0.01 <=0.89 IV   Treponema Abs w Reflex to RPR and Titer    Collection Time: 08/26/22  6:57 AM   Result Value Ref Range    Treponema Antibody Total Nonreactive Nonreactive   HIV Antigen Antibody Combo Pretransplant    Collection Time: 08/26/22  6:57 AM   Result Value Ref Range    HIV Antigen Antibody Combo Pretransplant Nonreactive Nonreactive   Hepatitis C antibody    Collection Time: 08/26/22  6:57 AM   Result Value Ref Range    Hepatitis C Antibody Nonreactive Nonreactive   Hepatitis B surface antigen    Collection Time: 08/26/22  6:57 AM   Result Value Ref Range    Hepatitis B Surface Antigen Nonreactive Nonreactive   Hepatitis B Surface Antibody    Collection Time: 08/26/22  6:57 AM   Result Value Ref Range    Hepatitis B Surface Antibody Instrument Value 93.08 <8.00 m[IU]/mL    Hepatitis B Surface Antibody Reactive    Hepatitis B core antibody    Collection Time: 08/26/22  6:57 AM   Result Value Ref Range    Hepatitis B Core Antibody Total Nonreactive Nonreactive   Protein  random urine    Collection Time: 08/26/22  6:57 AM   Result Value Ref Range    Total Protein Random Urine g/L <0.05 g/L    Total Protein Urine g/gr Creatinine      Creatinine Urine mg/dL 97 mg/dL   Albumin Random Urine Quantitative with Creat Ratio    Collection Time: 08/26/22  6:57 AM   Result Value Ref Range    Creatinine Urine mg/dL 97 mg/dL    Albumin Urine mg/L 7 mg/L    Albumin Urine mg/g Cr 7.22 0.00 - 25.00 mg/g Cr   Routine UA with microscopic    Collection Time: 08/26/22  6:57 AM   Result Value Ref Range    Color Urine Yellow Colorless, Straw, Light Yellow,  Yellow    Appearance Urine Clear Clear    Glucose Urine Negative Negative mg/dL    Bilirubin Urine Negative Negative    Ketones Urine Negative Negative mg/dL    Specific Gravity Urine 1.020 1.003 - 1.035    Blood Urine Trace (A) Negative    pH Urine 6.0 5.0 - 7.0    Protein Albumin Urine Negative Negative mg/dL    Urobilinogen Urine Normal Normal, 2.0 mg/dL    Nitrite Urine Negative Negative    Leukocyte Esterase Urine Negative Negative    RBC Urine <1 <=2 /HPF    WBC Urine 1 <=5 /HPF    Squamous Epithelials Urine 1 <=1 /HPF   CBC with platelets    Collection Time: 08/26/22  6:57 AM   Result Value Ref Range    WBC Count 4.3 4.0 - 11.0 10e3/uL    RBC Count 4.71 3.80 - 5.20 10e6/uL    Hemoglobin 14.0 11.7 - 15.7 g/dL    Hematocrit 41.7 35.0 - 47.0 %    MCV 89 78 - 100 fL    MCH 29.7 26.5 - 33.0 pg    MCHC 33.6 31.5 - 36.5 g/dL    RDW 13.2 10.0 - 15.0 %    Platelet Count 295 150 - 450 10e3/uL   Partial thromboplastin time    Collection Time: 08/26/22  6:57 AM   Result Value Ref Range    aPTT 27 22 - 38 Seconds   INR    Collection Time: 08/26/22  6:57 AM   Result Value Ref Range    INR 1.01 0.85 - 1.15   Hemoglobin A1c    Collection Time: 08/26/22  6:57 AM   Result Value Ref Range    Hemoglobin A1C 5.2 0.0 - 5.6 %   Phosphorus    Collection Time: 08/26/22  6:57 AM   Result Value Ref Range    Phosphorus 3.6 2.5 - 4.5 mg/dL   Uric acid    Collection Time: 08/26/22  6:57 AM   Result Value Ref Range    Uric Acid 3.2 2.6 - 6.0 mg/dL   Lipid Profile    Collection Time: 08/26/22  6:57 AM   Result Value Ref Range    Cholesterol 195 <200 mg/dL    Triglycerides 79 <150 mg/dL    Direct Measure HDL 85 >=50 mg/dL    LDL Cholesterol Calculated 94 <=100 mg/dL    Non HDL Cholesterol 110 <130 mg/dL    Patient Fasting > 8hrs? Yes    Comprehensive metabolic panel    Collection Time: 08/26/22  6:57 AM   Result Value Ref Range    Sodium 136 133 - 144 mmol/L    Potassium 3.5 3.4 - 5.3 mmol/L    Chloride 106 94 - 109 mmol/L    Carbon Dioxide  (CO2) 28 20 - 32 mmol/L    Anion Gap 2 (L) 3 - 14 mmol/L    Urea Nitrogen 10 7 - 30 mg/dL    Creatinine 0.86 0.52 - 1.04 mg/dL    Calcium 8.8 8.5 - 10.1 mg/dL    Glucose 91 70 - 99 mg/dL    Alkaline Phosphatase 48 40 - 150 U/L    AST 14 0 - 45 U/L    ALT 13 0 - 50 U/L    Protein Total 7.2 6.8 - 8.8 g/dL    Albumin 3.9 3.4 - 5.0 g/dL    Bilirubin Total 0.7 0.2 - 1.3 mg/dL    GFR Estimate 81 >60 mL/min/1.73m2   Quantiferon TB Gold Plus Grey Tube    Collection Time: 08/26/22  6:57 AM    Specimen: Arm, Right; Blood   Result Value Ref Range    Quantiferon Nil Tube 0.09 IU/mL   Quantiferon TB Gold Plus Green Tube    Collection Time: 08/26/22  6:57 AM    Specimen: Arm, Right; Blood   Result Value Ref Range    Quantiferon TB1 Tube 0.11 IU/mL   Quantiferon TB Gold Plus Yellow Tube    Collection Time: 08/26/22  6:57 AM    Specimen: Arm, Right; Blood   Result Value Ref Range    Quantiferon TB2 Tube 0.10    Quantiferon TB Gold Plus Purple Tube    Collection Time: 08/26/22  6:57 AM    Specimen: Arm, Right; Blood   Result Value Ref Range    Quantiferon Mitogen 10.00 IU/mL   Adult Type and Screen    Collection Time: 08/26/22  6:57 AM   Result Value Ref Range    ABO/RH(D) O POS     Antibody Screen Negative Negative    SPECIMEN EXPIRATION DATE [id number]    Quantiferon TB Gold Plus    Collection Time: 08/26/22  6:57 AM    Specimen: Arm, Right; Blood   Result Value Ref Range    Quantiferon-TB Gold Plus Negative Negative    TB1 Ag minus Nil Value 0.02 IU/mL    TB2 Ag minus Nil Value 0.01 IU/mL    Mitogen minus Nil Result 9.91 IU/mL    Nil Result 0.09 IU/mL   ABO and Rh    Collection Time: 08/26/22  7:26 AM   Result Value Ref Range    ABO/RH(D) O POS     SPECIMEN EXPIRATION DATE [id number]    Iohexol 1st Order    Collection Time: 08/26/22  9:37 AM   Result Value Ref Range    Iohexol Time 1 7.66 mg/dL    Iohexol Time 2 3.15 mg/dL    Iohexol Body Surface Area 1.743 m2    Iohexol Raw Clearance 102 mL/min    Iohexol Std Clearance  101 /1.73 m2   EKG 12-lead complete w/read - Clinics    Collection Time: 08/26/22 11:37 AM   Result Value Ref Range    Systolic Blood Pressure  mmHg    Diastolic Blood Pressure  mmHg    Ventricular Rate 66 BPM    Atrial Rate 66 BPM    SD Interval 214 ms    QRS Duration 84 ms     ms    QTc 404 ms    P Axis 78 degrees    R AXIS 71 degrees    T Axis 68 degrees    Interpretation ECG       Sinus rhythm with 1st degree A-V block  Biatrial enlargement  Nonspecific ST and T wave abnormality  Abnormal ECG  When compared with ECG of 19-APR-2013 09:25,  Nonspecific T wave abnormality now evident in Anterior leads  Confirmed by MD ROSENBERG JANE ([id number]) on 8/29/2022 4:33:11 PM     Echocardiogram Complete    Collection Time: 08/26/22  3:26 PM   Result Value Ref Range    LVEF  60-65%      Again, thank you for allowing me to participate in the care of your patient.      Sincerely,    Dwayne Asencio MD

## 2022-08-26 NOTE — PROGRESS NOTES
"SW met with Lori for brief visit to provide psychosocial support and update psychosocial assessment. Lori first came to [facility] in 2013 for a donor evaluation. At the time, it was decided that she was not at a good place in current life with young children and her  was not supportive of donation at the time. See Social Work Assessment by VIVIAN Vega (4/19/13) for details.     Lori finds that she is now in a better place to go through the donation process and her spouse is now supportive of her decision. Their children Naheed and Jax are now in college. Her spouse Kenneth to be caregiver post donation. Lori is experiencing some symptoms of depression. She has seen a therapist in the past but notes that it is difficult to find someone that she can \"build a real relationship with.\" She is currently on medication and looking to get back into therapy. We discussed Dialectical Behavioral Therapy (DBT) techniques to assist with navigating symptoms of depression. Lori appreciative of resources. She does not have any CD concerns. She has several positive coping mechanisms including exercise. SW will continue to follow for psychosocial support, resources and advocate on behalf of the patient.    We also discussed some unique issues that arise with paired kidney donation, which include the uncertainty of the timing and the importance of having a employment situation and support system that is able to provide sustained support and flexibility.    She appears capable of understanding this information and making an informed medical decision.    Impressions/Recommendations:   Lori  is highly motivated to donate Kidney  to [facility].  His decision to donate is free of inducement, coercion, or other undue pressure.   Her housing, finances and employment are stable.  No current/active mental health or chemical abuse issues were identified.  The need for a caregiver was reviewed and she is able to identify a plan to meet her " post operative care needs.  She appears capable of making an informed medical decision.  No psychosocial contraindications to living organ donation were identified and  I support Lori s desire to donate a Kidney to [facility].         Contact Information:     Melissa HUTTON [facility]  Outpatient Kidney/Pancreas/[facility] Transplant Program   [address] [place] [id number]  [email or web address]  Office: [phone] I Fax: [phone]

## 2022-08-26 NOTE — PROGRESS NOTES
[facility] [place]  Consult Note     Medical record number: [id number]  YOB: 1970,     Date of Visit:  08/26/2022  Consult requested by the patient for evaluation of kidney donation candidacy.    Assessment and Recommendations: Ms. Harris appears to be a excellent candidate for kidney donation at this point in the evaluation. The following issues will need to be addressed prior to formal review:  51 yo female non directed donor candidate  Mild HTN - presumed white coat  Will do home measurements  S/P double mastectomy for DCIS  No abd surgery  Lean body mass  Flat abdomen  Would be a good candidate, requires social work eval     Risks of the surgical procedure including but not limited to the rare risk of mortality discussed in detail. Patient verbalized good understanding and had several pertinent questions which were answered satisfactorily.       The majority of our visit today was spent in counseling regarding the medical and surgical risks of kidney donation, the typical margie-and post-operative experience and recovery/return to work pattern.  We also talked about post-op visits and longer term health care maintenance, as well as the implications of having one remaining kidney. This discussion included, but was not limited to rates of complications such as bleeding, infection, need for transfusion, reoperation, other organ injury, future bowel obstruction, incisional hernia, port site pain, varicocele, venous thrombosis, pulmonary embolism, renal failure, and death (3 per 10,000). At the conclusion of the visit, all questions had been answered and Ms. Harris's candidacy for donation will be reviewed at our Multidisciplinary Donor Selection Committee.     60 min spent on the date of the encounter in chart review, patient visit,  documentation and/or discussion with other providers about the issues documented  above.    .    ---------------------------------------------------------------------------------------------------    HPI: Ms. Harris wishes to donate a kidney to non directed.           NO  Personal history of cancer    []         Comment:     Personal history of kidney problems   []         Comment:   Personal history of diabetes    []         Comment:                  Bladder emptying problems (prostate, urinary retention) []         Comment:   Neck or Back problems:     []         Comment:   Constipation      []         Comment:       Frequent NSAID use:         []         Comment:       Other:        []         Comment:                Past Medical History:   Diagnosis Date     Depressive disorder, not elsewhere classified      First degree atrioventricular block      Headaches      Hypothyroidism      Irregular heart beat      Malignant neoplasm of breast (female), unspecified site      Other disorder of muscle, ligament, and fascia      Persistent disorder of initiating or maintaining sleep      Past Surgical History:   Procedure Laterality Date     COLONOSCOPY N/A 9/23/2015    Procedure: COLONOSCOPY;  Surgeon: Shy Franco MD;  Location:  GI     COLONOSCOPY N/A 10/28/2020    Procedure: COLONOSCOPY;  Surgeon: Shy Franco MD;  Location: [facility]     DISSECT LYMPH NODE AXILLA Bilateral 8/25/2014    Procedure: DISSECT LYMPH NODE AXILLA;  Surgeon: Renetta Delatorre MD;  Location: [facility]     EXCISE CYST THYROGLOSSAL DUCT  1988     FLUORESCENCE INTRAOPERATIVE VASCULAR ANGIOGRAPHY Bilateral 8/25/2014    Procedure: FLUORESCENCE INTRAOPERATIVE VASCULAR ANGIOGRAPHY;  Surgeon: Jyoti Richardson MD;  Location: [facility]     GRAFT FAT TO BREAST N/A 1/6/2015    Procedure: GRAFT FAT TO BREAST;  Surgeon: Jyoti Richardson MD;  Location: [facility]     INSERT TISSUE EXPANDER BREAST BILATERAL Bilateral 8/25/2014    Procedure: INSERT TISSUE EXPANDER BREAST BILATERAL;  Surgeon: Jyoti Richardson MD;   Location: [facility]     MASTECTOMY, NIPPLE SPARING Bilateral 8/25/2014    Procedure: MASTECTOMY, NIPPLE SPARING;  Surgeon: Renetta Delatorre MD;  Location: [facility]     RECONSTRUCT BREAST BILATERAL, IMPLANT PROSTHESIS BILATERAL, COMBINED Bilateral 1/6/2015    Procedure: COMBINED RECONSTRUCT BREAST BILATERAL, IMPLANT PROSTHESIS BILATERAL;  Surgeon: Jyoti Richardson MD;  Location: [facility]     Family History   Problem Relation Age of Onset     C.A.D. Father      Prostate Cancer Father      Breast Cancer Maternal Grandmother      Colon Cancer Mother      Social History     Socioeconomic History     Marital status:      Spouse name: Not on file     Number of children: Not on file     Years of education: Not on file     Highest education level: Not on file   Occupational History     Not on file   Tobacco Use     Smoking status: Never Smoker     Smokeless tobacco: Never Used   Substance and Sexual Activity     Alcohol use: Yes     Comment: three to four drinks per week     Drug use: No     Sexual activity: Yes     Partners: Male   Other Topics Concern     Parent/sibling w/ CABG, MI or angioplasty before 65F 55M? Not Asked   Social History Narrative     Not on file     Social Determinants of Health     Financial Resource Strain: Not on file   Food Insecurity: Not on file   Transportation Needs: Not on file   Physical Activity: Not on file   Stress: Not on file   Social Connections: Not on file   Intimate Partner Violence: Not on file   Housing Stability: Not on file       ROS:   CONSTITUTIONAL:  No fevers or chills  EYES: negative for icterus  ENT:  negative for hearing loss, tinnitus and sore throat  RESPIRATORY:  negative for cough, sputum, dyspnea  CARDIOVASCULAR:  negative for chest pain  GASTROINTESTINAL:  negative for nausea, vomiting, diarrhea or constipation  GENITOURINARY:  negative for incontinence, dysuria, bladder emptying problems  HEME:  No easy bruising  INTEGUMENT:  negative for rash and  pruritus  NEURO:  Negative for headache, seizure disorder    Allergies:   Allergies   Allergen Reactions     No Known Drug Allergy        Medications:  Prescription Medications as of 8/26/2022       Rx Number Disp Refills Start End Last Dispensed Date Next Fill Date Owning Pharmacy    buPROPion (WELLBUTRIN XL) 150 MG 24 hr tablet            Sig: Take 150 mg by mouth every morning    Class: Historical    Route: Oral    Cholecalciferol (VITAMIN D PO)            Sig: Take 1 tablet by mouth daily.    Class: Historical    Route: Oral    Cyanocobalamin (VITAMIN B 12 PO)            Sig: Take 1 tablet by mouth daily.    Class: Historical    Route: Oral    levothyroxine (SYNTHROID, LEVOTHROID) 75 MCG tablet            Sig: Take 1 tablet by mouth daily.    Class: Historical    Route: Oral    MULTIPLE VITAMIN PO            Sig: Take 1 tablet by mouth daily.    Class: Historical    Route: Oral    Naratriptan HCl (AMERGE PO)            Sig: Take 1 mg by mouth every 4 hours as needed for migraine    Class: Historical    Route: Oral      Clinic-Administered Medications as of 8/26/2022       Dose Frequency Start End    iohexol (OMNIPAQUE) 350 MG/ML injectable solution 4 mL (Completed) 4 mL ONCE 8/26/2022 8/26/2022    Route: Intravenous    sodium chloride (PF) 0.9% PF flush 5 mL (Completed) 5 mL ONCE 8/26/2022 8/26/2022    Admin Instructions: Post infusion line flush.    Route: Intracatheter        Exam:   Temp:  [98.1  F (36.7  C)] 98.1  F (36.7  C)  Pulse:  [65-66] 66  Resp:  [16] 16  BP: (144-164)/() 163/98  SpO2:  [99 %] 99 %  Appearance: in no apparent distress.   Skin: normal  Head and Neck: Normal, no rashes or jaundice  Respiratory: normal respiratory excursions, no audible wheeze  Cardiovascular: RRR  Abdomen: flat, No surgical scars   Extremeties: Edema, none  Neuro: without deficit       Labs:   ABO: O  Chemistries:   Recent Labs   Lab Test 08/26/22  0657      POTASSIUM 3.5   CHLORIDE 106   CO2 28   ANIONGAP  2*   GLC 91   BUN 10   CR 0.86   LYNN 8.8       Urine Studies:   Recent Labs   Lab Test 08/26/22  0657   COLOR Yellow   APPEARANCE Clear   URINEGLC Negative   URINEBILI Negative   URINEKETONE Negative   SG 1.020   UBLD Trace*   URINEPH 6.0   PROTEIN Negative   NITRITE Negative   LEUKEST Negative   RBCU <1   WBCU 1     Recent Labs   Lab Test 08/26/22  0657   MICROL 7       Hematology:      Recent Labs   Lab Test 08/26/22  0657   WBC 4.3   RBC 4.71   HGB 14.0   HCT 41.7   MCV 89   MCH 29.7   MCHC 33.6   RDW 13.2          Coags:   Recent Labs   Lab Test 08/26/22  0657   INR 1.01       Lipid Profile:   Cholesterol   Date Value Ref Range Status   08/26/2022 195 <200 mg/dL Final   04/19/2013 183 0 - 200 mg/dL Final     Comment:     LDL Cholesterol is the primary guide to therapy.   The NCEP recommends further evaluation of: patients with cholesterol greater   than 200 mg/dL if additional risk factors are present, cholesterol greater   than   240 mg/dL, triglycerides greater than 150 mg/dL, or HDL less than 40 mg/dL.     Triglycerides   Date Value Ref Range Status   08/26/2022 79 <150 mg/dL Final   04/19/2013 64 0 - 150 mg/dL Final     HDL Cholesterol   Date Value Ref Range Status   04/19/2013 77 50 - 110 mg/dL Final     Direct Measure HDL   Date Value Ref Range Status   08/26/2022 85 >=50 mg/dL Final     LDL Cholesterol Calculated   Date Value Ref Range Status   08/26/2022 94 <=100 mg/dL Final   04/19/2013 93 0 - 129 mg/dL Final     Comment:     LDL Cholesterol is the primary guide to therapy: LDL-cholesterol goal in high   risk patients is <100 mg/dL and in very high risk patients is <70 mg/dL.     Cholesterol/HDL Ratio   Date Value Ref Range Status   04/19/2013 2.4 0.0 - 5.0 Final     Non HDL Cholesterol   Date Value Ref Range Status   08/26/2022 110 <130 mg/dL Final       Virals:  CMV and EBV pending.   No lab results found.     Hepatitis C Antibody   Date Value Ref Range Status   04/19/2013 Negative NEG Final        Hepatitis C Antibody   Date Value Ref Range Status   04/19/2013 Negative NEG Final     Hep B Surface Hanh   Date Value Ref Range Status   04/19/2013 0.0  Final     Comment:     Negative, No antibody detected when the value is less than 5.0 mlU/mL.

## 2022-08-26 NOTE — PROGRESS NOTES
Living Kidney Donor Consent per OPTN Policy 14.2 for Independent Living Donor Advocate (REBA)    Organ Type: unrelated, non-directed  Presenting Information:  Lori Harris presents to [facility] [place] to complete a living donor evaluation since she is interested in becoming a kidney donor for someone in need.      Written assurance has been obtained from the potential donor that he/she:   Is willing to donate  Is free from inducement and coercion  Has been informed that the he/she may decline to donate at any time  Has been informed that transplant centers must:   A) Offer donors an opportunity to discontinue the donor consent or evaluation process in a way that is protected and confidential  B) Provide an independent living donor advocate (REBA) to assist the potential donor during this process    The following was presented to the potential donor in a language in which the potential donor is able to engage in meaningful dialogue:   Education and instruction about all phases of the living donation process including:   Consent  Medical and psychosocial evaluation  Information about the surgical procedure  Pre and post operative care  Benefits of post operative follow up  Disclosure that the recovery hospital will take all reasonable precautions to provide confidentiality for the donor/recipient  Disclosure that it is a federal crime for any person to knowingly acquire, obtain or otherwise transfer any human organ for valuable consideration  Disclosure that the recovery hospital must provide an independent living donor advocate (REBA)  Disclosure that health information obtained during the evaluation is subject to the same regulations as all records and could reveal conditions that must be reported to local, state, or federal public health authorities  Disclosure that the [facility] hospital is required to report living donor follow up information  at 6 months, 1 year, and 2 years, and that the potential donor must commit to post operative follow up testing coordinated by the [facility]    Disclosure has been provided that these risks may be transient or permanent & include but are not limited to:  Potential psychosocial risks:  Problems with body image  Post-surgery depression or anxiety  Feelings of emotional distress or bereavement if recipient experiences any recurrent disease or in the event of the recipient s death  Impact of donation on the donor s lifestyle, such as limited ability to exercise in the short term post operative recovery period, no driving for the first 2 weeks post op or until the donor is no longer needing pain medications that impair the ability to drive.      Potential financial impacts:  Personal expenses of travel, housing, , lost wages related to donation might not be reimbursed. However, resources may be available to defray some donation-related expenses.   Need for life-long follow up at the donor s expense  Loss of employment or income  Negative impact on the ability to obtain future employment  Negative impact on the ability to obtain, maintain, or afford health, disability, and life insurance  Future health problems experienced by living donors following donation may not be covered by the recipient s insurance      PREPARATION FOR DONATION, RECOVERY, AND POTENTIAL SHORT-LONG-TERM OUTCOMES:  Understanding of the Living Donation Process:  We discussed the role of Independent Living Donor Advocate.  Short and long term medical and psychosocial risks to both, donor and recipient were reviewed and she is able to teach back to me these riskssd.  Post surgical restrictions (2 weeks no driving, 6 weeks no lifting over 10 lbs) were reviewed and she appears capable of adhering to the post surgical requirements. The need for a caregiver was discussed and she has family who can provide care to her post surgery .  The risk  of poor psychosocial outcome including problems with body image, post-surgery depression or anxiety, or feelings of emotional distress or bereavement if recipient experiences any recurrent disease, poor outcome or death was reviewed.  Additionally, potential financial implications, including the risk of having difficulty obtaining health care insurance, life insurance, disability insurance, or long term care insurance were reviewed, as were available donor grants to assist with donor related expenses.      IMPRESSIONS/RECOMMENDATIONS:  Ms. Lori Harris appears highly motivated to donate a kidney to someone in need.  She appears capable of understanding this information and making an informed medical decision.  As REBA, no concerns were identified today.  She has my contact information and is aware that I am available thoughout the donation process.    Contact Information:  ALICE Cummins, [facility]  Independent Living Donor Advocate  [facility], [facility], [facility]  Pager:  [phone]  Direct:  [phone] Cell Phone  E-Mail:  [email or web address]      Time Spent: 30 minutes

## 2022-08-26 NOTE — NURSING NOTE
Chief Complaint   Patient presents with     Injections     Iohexal     Blood Draw     Labs drawn via piv start by RN. Vitals taken.     Labs drawn from PIV placed by RN. Line flushed with saline. Vitals taken. Pt checked in for appointment(s).    Sherry Pradhan RN

## 2022-08-26 NOTE — LETTER
8/26/2022         RE: Lori Harris  [address] [facility] [address]        Dear Colleague,    Thank you for referring your patient, Lori Harris, to the [facility]ANS[facility]. Please see a copy of my visit note below.    [facility]  Consult Note     Medical record number: [id number]  YOB: 1970,     Date of Visit:  08/26/2022  Consult requested by the patient for evaluation of kidney donation candidacy.    Assessment and Recommendations: Ms. Harris appears to be a excellent candidate for kidney donation at this point in the evaluation. The following issues will need to be addressed prior to formal review:  51 yo female non directed donor candidate  Mild HTN - presumed white coat  Will do home measurements  S/P double mastectomy for DCIS  No abd surgery  Lean body mass  Flat abdomen  Would be a good candidate, requires social work eval     Risks of the surgical procedure including but not limited to the rare risk of mortality discussed in detail. Patient verbalized good understanding and had several pertinent questions which were answered satisfactorily.       The majority of our visit today was spent in counseling regarding the medical and surgical risks of kidney donation, the typical margie-and post-operative experience and recovery/return to work pattern.  We also talked about post-op visits and longer term health care maintenance, as well as the implications of having one remaining kidney. This discussion included, but was not limited to rates of complications such as bleeding, infection, need for transfusion, reoperation, other organ injury, future bowel obstruction, incisional hernia, port site pain, varicocele, venous thrombosis, pulmonary embolism, renal failure, and death (3 per 10,000). At the conclusion of the visit, all questions had been answered and Ms. Harris's candidacy for donation will be reviewed  at our Multidisciplinary Donor Selection Committee.     60 min spent on the date of the encounter in chart review, patient visit,  documentation and/or discussion with other providers about the issues documented above.    .    ---------------------------------------------------------------------------------------------------    HPI: Ms. Harris wishes to donate a kidney to non directed.           NO  Personal history of cancer    []         Comment:     Personal history of kidney problems   []         Comment:   Personal history of diabetes    []         Comment:                  Bladder emptying problems (prostate, urinary retention) []         Comment:   Neck or Back problems:     []         Comment:   Constipation      []         Comment:       Frequent NSAID use:         []         Comment:       Other:        []         Comment:                Past Medical History:   Diagnosis Date     Depressive disorder, not elsewhere classified      First degree atrioventricular block      Headaches      Hypothyroidism      Irregular heart beat      Malignant neoplasm of breast (female), unspecified site      Other disorder of muscle, ligament, and fascia      Persistent disorder of initiating or maintaining sleep      Past Surgical History:   Procedure Laterality Date     COLONOSCOPY N/A 9/23/2015    Procedure: COLONOSCOPY;  Surgeon: Shy Franco MD;  Location: [facility]     COLONOSCOPY N/A 10/28/2020    Procedure: COLONOSCOPY;  Surgeon: Shy Franco MD;  Location: [facility]     DISSECT LYMPH NODE AXILLA Bilateral 8/25/2014    Procedure: DISSECT LYMPH NODE AXILLA;  Surgeon: Renetta Delatorre MD;  Location: [facility]     EXCISE CYST THYROGLOSSAL DUCT  1988     FLUORESCENCE INTRAOPERATIVE VASCULAR ANGIOGRAPHY Bilateral 8/25/2014    Procedure: FLUORESCENCE INTRAOPERATIVE VASCULAR ANGIOGRAPHY;  Surgeon: Jyoti Richardson MD;  Location: [facility]     GRAFT FAT TO BREAST N/A 1/6/2015    Procedure: GRAFT FAT TO BREAST;   Surgeon: Jyoti Richardson MD;  Location: [facility]     INSERT TISSUE EXPANDER BREAST BILATERAL Bilateral 8/25/2014    Procedure: INSERT TISSUE EXPANDER BREAST BILATERAL;  Surgeon: Jyoti Richardson MD;  Location: [facility]     MASTECTOMY, NIPPLE SPARING Bilateral 8/25/2014    Procedure: MASTECTOMY, NIPPLE SPARING;  Surgeon: Renetta Delatorre MD;  Location: [facility]     RECONSTRUCT BREAST BILATERAL, IMPLANT PROSTHESIS BILATERAL, COMBINED Bilateral 1/6/2015    Procedure: COMBINED RECONSTRUCT BREAST BILATERAL, IMPLANT PROSTHESIS BILATERAL;  Surgeon: Jyoti Richardson MD;  Location: [facility]     Family History   Problem Relation Age of Onset     C.A.D. Father      Prostate Cancer Father      Breast Cancer Maternal Grandmother      Colon Cancer Mother      Social History     Socioeconomic History     Marital status:      Spouse name: Not on file     Number of children: Not on file     Years of education: Not on file     Highest education level: Not on file   Occupational History     Not on file   Tobacco Use     Smoking status: Never Smoker     Smokeless tobacco: Never Used   Substance and Sexual Activity     Alcohol use: Yes     Comment: three to four drinks per week     Drug use: No     Sexual activity: Yes     Partners: Male   Other Topics Concern     Parent/sibling w/ CABG, MI or angioplasty before 65F 55M? Not Asked   Social History Narrative     Not on file     Social Determinants of Health     Financial Resource Strain: Not on file   Food Insecurity: Not on file   Transportation Needs: Not on file   Physical Activity: Not on file   Stress: Not on file   Social Connections: Not on file   Intimate Partner Violence: Not on file   Housing Stability: Not on file       ROS:   CONSTITUTIONAL:  No fevers or chills  EYES: negative for icterus  ENT:  negative for hearing loss, tinnitus and sore throat  RESPIRATORY:  negative for cough, sputum, dyspnea  CARDIOVASCULAR:  negative for chest pain  GASTROINTESTINAL:   negative for nausea, vomiting, diarrhea or constipation  GENITOURINARY:  negative for incontinence, dysuria, bladder emptying problems  HEME:  No easy bruising  INTEGUMENT:  negative for rash and pruritus  NEURO:  Negative for headache, seizure disorder    Allergies:   Allergies   Allergen Reactions     No Known Drug Allergy        Medications:  Prescription Medications as of 8/26/2022       Rx Number Disp Refills Start End Last Dispensed Date Next Fill Date Owning Pharmacy    buPROPion (WELLBUTRIN XL) 150 MG 24 hr tablet            Sig: Take 150 mg by mouth every morning    Class: Historical    Route: Oral    Cholecalciferol (VITAMIN D PO)            Sig: Take 1 tablet by mouth daily.    Class: Historical    Route: Oral    Cyanocobalamin (VITAMIN B 12 PO)            Sig: Take 1 tablet by mouth daily.    Class: Historical    Route: Oral    levothyroxine (SYNTHROID, LEVOTHROID) 75 MCG tablet            Sig: Take 1 tablet by mouth daily.    Class: Historical    Route: Oral    MULTIPLE VITAMIN PO            Sig: Take 1 tablet by mouth daily.    Class: Historical    Route: Oral    Naratriptan HCl (AMERGE PO)            Sig: Take 1 mg by mouth every 4 hours as needed for migraine    Class: Historical    Route: Oral      Clinic-Administered Medications as of 8/26/2022       Dose Frequency Start End    iohexol (OMNIPAQUE) 350 MG/ML injectable solution 4 mL (Completed) 4 mL ONCE 8/26/2022 8/26/2022    Route: Intravenous    sodium chloride (PF) 0.9% PF flush 5 mL (Completed) 5 mL ONCE 8/26/2022 8/26/2022    Admin Instructions: Post infusion line flush.    Route: Intracatheter        Exam:   Temp:  [98.1  F (36.7  C)] 98.1  F (36.7  C)  Pulse:  [65-66] 66  Resp:  [16] 16  BP: (144-164)/() 163/98  SpO2:  [99 %] 99 %  Appearance: in no apparent distress.   Skin: normal  Head and Neck: Normal, no rashes or jaundice  Respiratory: normal respiratory excursions, no audible wheeze  Cardiovascular: RRR  Abdomen: flat, No  surgical scars   Extremeties: Edema, none  Neuro: without deficit       Labs:   ABO: O  Chemistries:   Recent Labs   Lab Test 08/26/22  0657      POTASSIUM 3.5   CHLORIDE 106   CO2 28   ANIONGAP 2*   GLC 91   BUN 10   CR 0.86   LYNN 8.8       Urine Studies:   Recent Labs   Lab Test 08/26/22  0657   COLOR Yellow   APPEARANCE Clear   URINEGLC Negative   URINEBILI Negative   URINEKETONE Negative   SG 1.020   UBLD Trace*   URINEPH 6.0   PROTEIN Negative   NITRITE Negative   LEUKEST Negative   RBCU <1   WBCU 1     Recent Labs   Lab Test 08/26/22  0657   MICROL 7       Hematology:      Recent Labs   Lab Test 08/26/22  0657   WBC 4.3   RBC 4.71   HGB 14.0   HCT 41.7   MCV 89   MCH 29.7   MCHC 33.6   RDW 13.2          Coags:   Recent Labs   Lab Test 08/26/22  0657   INR 1.01       Lipid Profile:   Cholesterol   Date Value Ref Range Status   08/26/2022 195 <200 mg/dL Final   04/19/2013 183 0 - 200 mg/dL Final     Comment:     LDL Cholesterol is the primary guide to therapy.   The NCEP recommends further evaluation of: patients with cholesterol greater   than 200 mg/dL if additional risk factors are present, cholesterol greater   than   240 mg/dL, triglycerides greater than 150 mg/dL, or HDL less than 40 mg/dL.     Triglycerides   Date Value Ref Range Status   08/26/2022 79 <150 mg/dL Final   04/19/2013 64 0 - 150 mg/dL Final     HDL Cholesterol   Date Value Ref Range Status   04/19/2013 77 50 - 110 mg/dL Final     Direct Measure HDL   Date Value Ref Range Status   08/26/2022 85 >=50 mg/dL Final     LDL Cholesterol Calculated   Date Value Ref Range Status   08/26/2022 94 <=100 mg/dL Final   04/19/2013 93 0 - 129 mg/dL Final     Comment:     LDL Cholesterol is the primary guide to therapy: LDL-cholesterol goal in high   risk patients is <100 mg/dL and in very high risk patients is <70 mg/dL.     Cholesterol/HDL Ratio   Date Value Ref Range Status   04/19/2013 2.4 0.0 - 5.0 Final     Non HDL Cholesterol   Date Value  Ref Range Status   08/26/2022 110 <130 mg/dL Final       Virals:  CMV and EBV pending.   No lab results found.     Hepatitis C Antibody   Date Value Ref Range Status   04/19/2013 Negative NEG Final       Hepatitis C Antibody   Date Value Ref Range Status   04/19/2013 Negative NEG Final     Hep B Surface Hanh   Date Value Ref Range Status   04/19/2013 0.0  Final     Comment:     Negative, No antibody detected when the value is less than 5.0 mlU/mL.             Again, thank you for allowing me to participate in the care of your patient.        Sincerely,        Dulce Joyner MD

## 2022-08-26 NOTE — LETTER
"    8/26/2022         RE: Lori Harris  [address] [facility] [address]        Dear Colleague,    Thank you for referring your patient, Lori Harris, to the [facility]. Please see a copy of my visit note below.    Chief Complaint   Patient presents with     Injections     Iohexal     Iohexol Timed Test Nursing Note    Patient comes to [facility] today for a Iohexol GFR Timed test.   Orders from Dr. Asencio were completed.    Progress Note  Height: 5'8\"  Weight: 140 lb  Age: 52  Gender: Female    The following information was verified with the patient:  *Female patients are not pregnant or could not have become recently pregnant: No  *Is there a history of allergy (skin rash, swelling, ect) to :   a. Iodine (except skin reactions to Betadine): NO   b. Intravenous radio-contrast agents: NO   c. Seafood: Patient reports possible rash from scallops. Confirmed with pharmacy she has had iohexal contrast in past and patient reports no previous issues.      RN provided patient with educational handout regarding timed test. YES    Medication administered :   Iohexol (Omnipaque 300 mg Iodine/ ml concentration) 5 mls.  Site administered Left AC  Start time 0728  Stop time 0730    Blood draws were taken from Right AC in lab.  Baseline (Time 0) 0730: the time immediately after the injection is completed.  (2 Hour) 0930  (4 hour) 1130    Patient tolerated the procedure well    Vitals were reviewed       Discharge Plan  Discharge instructions reviewed with patient: YES  Discharge papers printed and given to patient: YES  Patient/Representative verbalized understanding, all questions answered: YES    Discharged from unit at 0735 with whom: self to Transplant Clinic.    Vital signs:  Temp: 98.1  F (36.7  C) Temp src: Oral BP: (!) 144/95 Pulse: 65   Resp: 16 SpO2: 99 %       Weight: 65.3 kg (143 lb 14.4 oz)  Estimated body mass index is 21.88 kg/m  as " "calculated from the following:    Height as of 10/28/20: 1.727 m (5' 8\").    Weight as of this encounter: 65.3 kg (143 lb 14.4 oz).      Sincerely,    [facility] [place]    "

## 2022-08-26 NOTE — NURSING NOTE
I saw Lori Harris in clinic during kidney donor evaluation.  Has offered to be donor to Non Direct Donor program.  Discussed surgery hospitalization and recovery.    Knows when and how to obtain evaluation results and the process for scheduling surgery.  Has received and reviewed the donor education materials including [facility].    Reviewed SRTR Data sheet.  Donor was Provided Living Donor Collective (LDC) Materials? Yes  Donor has agreed to be contacted by SRTR for follow-up LDC Questions? Yes  Patient was born in the [place]  I reviewed her medical/surgical history:  Breast Cancer, bilateral mastectomy in 2014 with reconstruction in 2015.  Hypothyroidism.    Medications:  Synthroid, wellbutrin, Naratriptan for migraines.  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    Requested routine cancer screening tests: n/a  Time spent 20 minutes.    I reviewed the possible ways that she can give a kidney.  One being running a matchrun after setting a date and her kidney would be allocated to the person at the top of the list.  I also reviewed that details pertaining to the Paired Exchange programs.                              1.National Kidney Registry                          2.Internal Exchange  I discussed with patient the matching procedure and logistics of each program.  Patient has signed consent for:                          1. National Kidney Registry                          2. Internal Exchange Program                          3. Shipping of donor kidney               4. Non-Direct Donor Program    5. LUDMILA form for purposes of sharing evaluation and listing data for the Paired Exchange Program.  [facility] Logistics/Timing:    I reviewed that in [facility] you are not able to choose your match, but they may decline a match.  I showed her possible scenarios that can happen when a chain is put together where there is a possibility of helping more than one candidate receive a kidney.     I reviewed that the  timeline for listing and finding a match is unknown, there may be significant waiting time to find a matched based on blood type.     I described the timing of a typical KPD event, where the donor cases go early in the morning and then the kidneys are shipped to the recipient site.     That the donor s kidney could be lost in transport, and other potentially negative consequences related to shipping a kidney.  Insurance:   I also reviewed that in D program the insurance of her matched recipient will be paying for her donation costs.  I also reviewed:              1. The possibility that the matched candidate s insurance might not cover travel costs if the paired donor travels to the matched recipient transplant hospital.              2. The possibility of the paired donor s name appearing on the matched candidate s insurance estimation of benefits.    [facility] Blood Draw and Testing requirements:   I reviewed that frequent lab draws are necessary in the D program.  I explained that [facility] will send her a kit when we initiate listing so that her blood can be frozen to be used for exploratory crossmatches.  I stated that there is also additional testing needed when a match offer is given for confirmatory compatibility testing and infectious disease testing.  I reviewed that her evaluation is good for one year.  If we are waiting after one year, then she could have labs done to update his evaluation.    I reviewed that it is okay to send a card to her recipient, that we ask that she leave any identifying information off of the correspondence.  I stated that if all parties are willing, the Transplant office can facilitate meetings between matched donors and recipients.  I reviewed that she has the right to withdraw from participation in the D program at any time, for any reason.   Time spent 20 minutes.       Living Kidney Donor Consent per OPTN Policy for Transplant Coordinators     Written assurance has been obtained  from the patient that the donor:   1) Is willing to donate  2) Is free from inducement and coercion  3) Has been informed that the donor may decline to donate at any time  4) Has been informed that transplant centers must:   A) Offer donors an opportunity to discontinue the donor consent or evaluation process in a way that is protected and confidential  B) Provide an independent donor advocate (GLADYS) to assist the potential donor during this process     The following was presented in a language in which donor is able to engage in meaningful dialogue and was reviewed and discussed with the patient by the transplant coordinator and the physician.      The following has been provided:   Education and instruction about all phases of the living donation process includin) Consent  2) The donor must undergo medical and psychosocial evaluations  3) Disclosure that the recovery hospital will take all reasonable precautions to provide confidentiality for the donor/recipient  4) Disclosure that it is a federal crime for any person to knowingly acquire, obtain or otherwise transfer any human organ for valuable consideration  5) The transplant hospital may refuse the potential donor, and the donor could be evaluated by another transplant program with different selection criteria  6) Data from the most recent SRTR center-specific reports:   A) National 1-year patient and graft survival rates  B) Hospital s 1-year patient and graft survival rates  C) Notification about all CMS outcome requirements not being met by the recovery and recipient s hospitals     The following has been provided:   1) Education about expected post-donation kidney function and how chronic kidney disease and end-stage renal disease might potentially impact the donor in the future to include:  A) On average, donors will have 25-35% permanent loss of kidney function at donation  B) Baseline risk of End Stage Renal Disease (ESRD) does not exceed that of  members of general population with same demographic profile  C) Donor risks must be interpreted in light of known epidemiology of both Chronic Kidney Disease (CKD) or ESRD  D) CKD generally develops in midlife (40-50 years old)  E) ESRD generally develops after age 60  F) Medical evaluation of young potential donor cannot predict lifetime risk  2) Donors may be at higher risk for CKD if they sustain damage to the remaining kidney. 3) Development of CKD and progression to ESRD may be more rapid with only 1 kidney  4) Dialysis is required when reaching ESRD  5) Current practice prioritizes prior living kidney donors who became kidney transplant candidates  6) Alternate procedures or courses of treatment for the recipient, including  donor transplant  A) A  donor kidney may become available for the recipient before donor evaluation is complete or transplant occurs  B) Any transplant candidate may have risk factors for increased morbidity or mortality that are not disclosed to the potential donor  7) The patient will receive a thorough medical and psychosocial evaluation  8) Health information obtained during the evaluation is subject to the same regulations as all records and could reveal conditions that must be reported to local, state, or federal public health authorities  9) The [facility] [place], is required to report living donor follow up information at 6, 12, and 24 months  10) Potential donors must commit to post operative follow up testing coordinated by the [facility]  11) Any infectious disease or malignancy pertinent to acute recipient care discovered during the potential donor s first two years of follow up care:  A) Will be disclosed to the donor  B) May need to be reported to local, state or federal public health authorities  C) Will be disclosed to their recipient s transplant center, and  D) Will be reported through the OPTN Improving Patient Safety  Portal     Disclosure has been provided that these risks may be transient or permanent & include but are not limited to potential medical or surgical risks:  Death  Scars, pain, fatigue, and other consequences typical of any surgical procedure  Decreased kidney function  Abdominal or bowel symptoms such as bloating and nausea, and developing bowel obstruction  Kidney failure and the need for dialysis or kidney transplant for the donor  Impact of obesity, hypertension or other donor-specific medical conditions on morbidity and mortality of the potential donor.    Blood pressure elevated in clinic today, will plan to do a 24 hour blood pressure monitor per Dr Asencio.  Questions answered.  Soha Jackson RN  Living Donor Coordinator  08/26/2022 1:17 PM

## 2022-08-28 LAB
GAMMA INTERFERON BACKGROUND BLD IA-ACNC: 0.09 IU/ML
M TB IFN-G BLD-IMP: NEGATIVE
M TB IFN-G CD4+ BCKGRND COR BLD-ACNC: 9.91 IU/ML
MITOGEN IGNF BCKGRD COR BLD-ACNC: 0.01 IU/ML
MITOGEN IGNF BCKGRD COR BLD-ACNC: 0.02 IU/ML
QUANTIFERON MITOGEN: 10 IU/ML
QUANTIFERON NIL TUBE: 0.09 IU/ML
QUANTIFERON TB1 TUBE: 0.11 IU/ML
QUANTIFERON TB2 TUBE: 0.1
WNV IGG SER IA-ACNC: 0.29 IV
WNV IGM SER IA-ACNC: 0.01 IV

## 2022-08-29 LAB — TRYPANOSOMA CRUZI: NORMAL

## 2022-08-30 LAB
BSA: 1.74 M2
IOHEXOL CL UR+SERPL-VRATE: 101 /1.73 M2
IOHEXOL CL UR+SERPL-VRATE: 102 ML/MIN
IOHEXOL CL UR+SERPL-VRATE: 3.15 MG/DL
IOHEXOL CL UR+SERPL-VRATE: 7.66 MG/DL
STRONGYLOIDES IGG SER IA-ACNC: 0.7 IV

## 2022-08-31 ENCOUNTER — COMMITTEE REVIEW (OUTPATIENT)
Dept: TRANSPLANT | Facility: CLINIC | Age: 52
End: 2022-08-31

## 2022-08-31 NOTE — COMMITTEE REVIEW
Living Donor Committee Review Note Evaluation Date: 8/26/2022  Committee Review Date: 8/31/2022    Donor being evaluated for: Kidney    Transplant Phase: Evaluation  Transplant Status: Active    Transplant Coordinator: Soha Jackson  Transplant Surgeon:       Committee Review Members:  Independent Living Donor Advocate Anna Best, [facility]   Nephrology Josue Carlos MD, Nata Tinajero MD, Dwayne Asencio MD   Nutrition Yovana Louis, AMALIA   Pharmacist Jake Arredondo, [facility]    - Clinical Jeannettekarie Kiran, [facility]   Transplant Shy Monserrat Frye, RN, Floridalma Gonzalez, RN, Modesta Hoffmann, RN, Soha Jackson, KASI, Sylwia Cates, KASI, Estrellita Goldsmith RN   Transplant Surgery Dimas Hopkins MD, Luci Dominguez MD, MD       Transplant Eligibility: Acceptable Mental Health, Pending blood pressure monitor    Committee Review Decision: Needs Re-presentation    Relative Contraindications:     Absolute Contraindications:     Committee Chair Dimas Hopkins MD verbally attested to the committee's decision.    Committee Discussion Details: Reviewed all test results and consultation recommendations.  1. Patient was hypertensive and 24 hour blood pressure monitor is recommended.    At Designated CT review meeting on 8/30/22 Attending physician Dr Luci Dominguez and Soha Jackson present:  CT report reviewed and Dr Dominguez is not convinced it is a nutcracker compression upon looking at images.  Laterality choice is Left kidney due to size of kidneys and two ureters on the Right kidney.  Right kidney is 52 % size, and left is 48 %.  No further testing is needed due to choice of laterality is clearly Left Kidney.  Soha Jackson RN  Living Donor Coordinator  08/31/2022 2:01 PM

## 2022-08-31 NOTE — PROGRESS NOTES
TRANSPLANT NEPHROLOGY DONOR EVALUATION    Assessment and Plan:  # Prospective Kidney Transplant Donor: Patient with a couple of issues that need to be addressed prior to donation. Patient's blood pressure is elevated at this visit, kidney function appears to be acceptable with Iohexol pending, and urinalysis is bland.    # Elevated Blood Pressure: Patient has elevated blood pressure today, but also reports recent high blood pressure when she tried to donate blood and she was denied in doing so.  Recommend 24 hour ambulatory blood pressure monitor.    # Abnormal EKG: Patient had 1st degree AV block with biatrial enlargement and nonspecific ST and T wave abnormalities.  Cardiac echo was unremarkable with normal LVEF ~ 60-65% and no evidence of LVH or diastolic dysfunction.    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  She underwent bilateral mastectomy.  No issues since.    Discussed the risks of donating a kidney, including the surgical risk and the possible risks of living with one kidney.    Education about expected post-donation kidney function and how chronic kidney disease (CKD) and end stage kidney disease (ESKD) might potentially impact the donor in the future, include, but not limited to:       - On average, donors will have 25-35% permanent loss of kidney function at donation.       - Baseline risk of ESKD may slightly exceed that of members of the general population with the same demographic profile.       - Donor risks must be interpreted in light of known epidemiology of both CKD or ESKD, such as that CKD generally develops in midlife (40-50 years old) and ESKD generally develops after age 60.       - Medical evaluation of young potential donors cannot predict lifetime risk of CKD or ESKD.       - Donors may be at higher risk for CKD if they sustain damage to the remaining kidney.       - Development of CKD and progression of ESKD may be more rapid  with only 1 kidney.       - Some type of kidney replacement therapy, either kidney transplant or dialysis, is required when reaching ESKD.    Potential medical or surgical risks include, but not limited to:       - Death.       - Scars, pain, fatigue, and other consequences typical of any surgical procedure.       - Decreased kidney function.       - Abdominal or bowel symptoms, such as bloating and nausea, and developing bowel obstruction.       - Kidney failure (ESKD) and the need for a kidney transplant or dialysis for the donor.       - Impact of obesity, hypertension, or other donor-specific medical conditions on morbidity and mortality of the potential donor.    Patients overall evaluation will be discussed with the transplant team and a final recommendation on the patients' suitability to be a kidney transplant donor will be made at that time.    Consult:  Lori Harris was seen in consultation at the request of Dr. Dulce Joyner for evaluation as a potential kidney transplant donor.    Reason for Visit:  Lori Harris is a 52 year old female who presents for a kidney donor evaluation.  Patient would like to be a non-directed donor.    Present Condition and Donor-Related Medical History:    Patient was previously evaluated as a potential kidney donor 4/19/13 and eventually was approved.  However, prior to donating, she developed breast cancer and everything was stopped.  Patient was diagnosed with right ductal in situ cancer, stage 0 in 8/2014.  She underwent bilateral mastectomy and subsequent breast reconstruction.  No issues since.    Energy level is good and has been normal.  She is active and does get some exercise.  Denies any chest pain or shortness of breath with exertion.  Appetite is good and no recent weight change.  No nausea, vomiting or diarrhea.  No fever, sweats or chills.  No leg swelling.  No pain or burning with urination.         Kidney Disease Hx:       h/o Kidney  Problems: No  Family h/o Genetic Kidney Disease: No       h/o Hypertension: Possible   Usual Blood Pressure: Recent BP was ~ 150/100 while trying to donate blood       h/o Protein in Urine: No  h/o Blood in Urine: No       h/o Kidney Stones: No  h/o Kidney Injury: No       h/o Recurrent UTI: No  h/o Genitourinary Problems: No       h/o Chronic NSAID Use: No         Other Medical Hx:       h/o Diabetes: No             h/o Gastrointestinal, Pancreas or Liver Problems: No       h/o Lung or Heart Problems: No       h/o Hematologic Problems: No  h/o Bleeding or Clotting Problems: No       h/o Cancer: Yes: Breast Cancer       h/o Infection Problems: No       h/o Gestational DM: No      h/o Preeclampsia: No         Skin Cancer Risk:       h/o more than 50 moles: Yes; Follows with Dermatology       h/o extensive sun exposure: No       h/o melanoma: No       Family h/o melanoma: No         Mental Health Assessment:       h/o Depression: Yes: No hospitalizations.  Not following with mental health.       h/o Psychiatric Illness: No       h/o Suicidal Attempt(s): No    COVID Status:  Vaccination Up To Date: Yes  H/o COVID Infection: Yes; 1/2022 with no residual symptoms.    Review Of Systems:   A comprehensive review of systems was obtained and negative, except as noted in the HPI or PMH.    Past Medical History:   History was taken from the patient as noted below.  Past Medical History:   Diagnosis Date     Carcinoma in situ of right breast 08/2014    Ductal in situ; Stage 0     Depressive disorder, not elsewhere classified      First degree atrioventricular block      Headaches      Hypothyroidism      Irregular heart beat      Other disorder of muscle, ligament, and fascia      Persistent disorder of initiating or maintaining sleep      Vitamin D deficiency        Past Social History:   Past Surgical History:   Procedure Laterality Date     COLONOSCOPY N/A 9/23/2015    Procedure: COLONOSCOPY;  Surgeon: Shy Franco,  MD;  Location:  GI     COLONOSCOPY N/A 10/28/2020    Procedure: COLONOSCOPY;  Surgeon: Shy Franco MD;  Location:  GI     DISSECT LYMPH NODE AXILLA Bilateral 8/25/2014    Procedure: DISSECT LYMPH NODE AXILLA;  Surgeon: Renetta Delatorre MD;  Location: [facility]     EXCISE CYST THYROGLOSSAL DUCT  1988     FLUORESCENCE INTRAOPERATIVE VASCULAR ANGIOGRAPHY Bilateral 8/25/2014    Procedure: FLUORESCENCE INTRAOPERATIVE VASCULAR ANGIOGRAPHY;  Surgeon: Jyoti Richardson MD;  Location: [facility]     GRAFT FAT TO BREAST N/A 1/6/2015    Procedure: GRAFT FAT TO BREAST;  Surgeon: Jyoti Richardson MD;  Location: [facility]     INSERT TISSUE EXPANDER BREAST BILATERAL Bilateral 8/25/2014    Procedure: INSERT TISSUE EXPANDER BREAST BILATERAL;  Surgeon: Jyoti Richardson MD;  Location: [facility]     MASTECTOMY, NIPPLE SPARING Bilateral 8/25/2014    Procedure: MASTECTOMY, NIPPLE SPARING;  Surgeon: Renetta Delatorre MD;  Location: [facility]     RECONSTRUCT BREAST BILATERAL, IMPLANT PROSTHESIS BILATERAL, COMBINED Bilateral 1/6/2015    Procedure: COMBINED RECONSTRUCT BREAST BILATERAL, IMPLANT PROSTHESIS BILATERAL;  Surgeon: Jyoti Richardson MD;  Location: [facility]     Personal or family history of anesthesia problems: No    Family History:   Family History   Problem Relation Age of Onset     Colon Cancer Mother      Skin Cancer Mother      Coronary Artery Disease Father         MI at age 55     Prostate Cancer Father      Hypertension Father      Breast Cancer Maternal Grandmother      Coronary Artery Disease Paternal Grandmother      Kidney Disease No family hx of      Diabetes No family hx of           Specific Family History in First Degree Relatives:       FH of Kidney Dz: No FH of Diabetes: No       FH of Hypertension: Yes   FH of CAD: Yes        FH of Cancer: Yes   FH of Kidney Cancer: No    Personal History:   Social History     Socioeconomic History     Marital status:      Spouse name: Not on file      Number of children: 2     Years of education: Not on file     Highest education level: Not on file   Occupational History     Not on file   Tobacco Use     Smoking status: Never Smoker     Smokeless tobacco: Never Used   Substance and Sexual Activity     Alcohol use: Yes     Alcohol/week: 9.0 standard drinks     Types: 9 Standard drinks or equivalent per week     Drug use: No     Sexual activity: Yes     Partners: Male   Other Topics Concern     Parent/sibling w/ CABG, MI or angioplasty before 65F 55M? Not Asked   Social History Narrative     Not on file     Social Determinants of Health     Financial Resource Strain: Not on file   Food Insecurity: Not on file   Transportation Needs: Not on file   Physical Activity: Not on file   Stress: Not on file   Social Connections: Not on file   Intimate Partner Violence: Not on file   Housing Stability: Not on file          Specific Social History:       Health Insurance Status: Yes       Employment Status: Full time  Occupation:                        Living Arrangements: lives with their spouse       Social Support: Yes       Presence of increased risk for disease transmission behaviors as defined by [facility] guidelines: No        Allergies:  Allergies   Allergen Reactions     No Known Drug Allergy        Medications:  Current Outpatient Medications   Medication Sig     buPROPion (WELLBUTRIN XL) 150 MG 24 hr tablet Take 150 mg by mouth every morning     Cholecalciferol (VITAMIN D PO) Take 1 tablet by mouth daily.     levothyroxine (SYNTHROID, LEVOTHROID) 75 MCG tablet Take 1 tablet by mouth daily.     Naratriptan HCl (AMERGE PO) Take 1 mg by mouth every 4 hours as needed for migraine     No current facility-administered medications for this visit.     Medications Discontinued During This Encounter   Medication Reason     Cyanocobalamin (VITAMIN B 12 PO)      MULTIPLE VITAMIN PO          Vitals:  Vital Signs 8/26/2022 8/26/2022 8/26/2022   Systolic 164 155 163    Diastolic 101 111 98   Pulse - - -   Temperature - - -   Respirations - - -   Weight (LB) - - -   Height - - -   BMI (Calculated) - - -   Pain Score - - -   O2 - - -       Exam:   GENERAL APPEARANCE: alert and no distress  HENT: mouth without ulcers or lesions  LYMPHATICS: no cervical or supraclavicular nodes  RESP: lungs clear to auscultation - no rales, rhonchi or wheezes  CV: regular rhythm, normal rate, no rub, no murmur  EDEMA: no LE edema bilaterally  ABDOMEN: soft, nondistended, nontender, bowel sounds normal  MS: extremities normal - no gross deformities noted, no evidence of inflammation in joints, no muscle tenderness  SKIN: no rash    Results:   Labs and imaging were ordered for this visit and reviewed by me.  Recent Results (from the past 336 hour(s))   HCG quantitative pregnancy    Collection Time: 08/26/22  6:57 AM   Result Value Ref Range    hCG Quantitative <1 0 - 5 IU/L   EBV Capsid Antibody IgM    Collection Time: 08/26/22  6:57 AM   Result Value Ref Range    EBV Capsid Hanh IgM Instrument Value 14.9 <36.0 U/mL    EBV Capsid Antibody IgM No detectable antibody. No detectable antibody.   EBV Capsid Antibody IgG    Collection Time: 08/26/22  6:57 AM   Result Value Ref Range    EBV Capsid Hanh IgG Instrument Value 554.0 (H) <18.0 U/mL    EBV Capsid Antibody IgG Positive (A) No detectable antibody.   CMV Antibody IgG    Collection Time: 08/26/22  6:57 AM   Result Value Ref Range    CMV Hanh IgG Instrument Value 9.50 (H) <0.60 U/mL    CMV Antibody IgG Positive, suggests recent or past exposure. (A) No detectable antibody.    Trypanosoma Cruzi    Collection Time: 08/26/22  6:57 AM   Result Value Ref Range    Trypanosoma Cruzi Non-Reactive Non-Reactive   Strongyloides antibody IgG    Collection Time: 08/26/22  6:57 AM   Result Value Ref Range    Strongyloides Hanh IgG 0.7 <=0.9 IV   West Nile Virus Antibody IgG IgM    Collection Time: 08/26/22  6:57 AM   Result Value Ref Range    West Nile IgG Serum 0.29  <=1.29 IV    West Nile IgM Serum 0.01 <=0.89 IV   Treponema Abs w Reflex to RPR and Titer    Collection Time: 08/26/22  6:57 AM   Result Value Ref Range    Treponema Antibody Total Nonreactive Nonreactive   HIV Antigen Antibody Combo Pretransplant    Collection Time: 08/26/22  6:57 AM   Result Value Ref Range    HIV Antigen Antibody Combo Pretransplant Nonreactive Nonreactive   Hepatitis C antibody    Collection Time: 08/26/22  6:57 AM   Result Value Ref Range    Hepatitis C Antibody Nonreactive Nonreactive   Hepatitis B surface antigen    Collection Time: 08/26/22  6:57 AM   Result Value Ref Range    Hepatitis B Surface Antigen Nonreactive Nonreactive   Hepatitis B Surface Antibody    Collection Time: 08/26/22  6:57 AM   Result Value Ref Range    Hepatitis B Surface Antibody Instrument Value 93.08 <8.00 m[IU]/mL    Hepatitis B Surface Antibody Reactive    Hepatitis B core antibody    Collection Time: 08/26/22  6:57 AM   Result Value Ref Range    Hepatitis B Core Antibody Total Nonreactive Nonreactive   Protein  random urine    Collection Time: 08/26/22  6:57 AM   Result Value Ref Range    Total Protein Random Urine g/L <0.05 g/L    Total Protein Urine g/gr Creatinine      Creatinine Urine mg/dL 97 mg/dL   Albumin Random Urine Quantitative with Creat Ratio    Collection Time: 08/26/22  6:57 AM   Result Value Ref Range    Creatinine Urine mg/dL 97 mg/dL    Albumin Urine mg/L 7 mg/L    Albumin Urine mg/g Cr 7.22 0.00 - 25.00 mg/g Cr   Routine UA with microscopic    Collection Time: 08/26/22  6:57 AM   Result Value Ref Range    Color Urine Yellow Colorless, Straw, Light Yellow, Yellow    Appearance Urine Clear Clear    Glucose Urine Negative Negative mg/dL    Bilirubin Urine Negative Negative    Ketones Urine Negative Negative mg/dL    Specific Gravity Urine 1.020 1.003 - 1.035    Blood Urine Trace (A) Negative    pH Urine 6.0 5.0 - 7.0    Protein Albumin Urine Negative Negative mg/dL    Urobilinogen Urine Normal  Normal, 2.0 mg/dL    Nitrite Urine Negative Negative    Leukocyte Esterase Urine Negative Negative    RBC Urine <1 <=2 /HPF    WBC Urine 1 <=5 /HPF    Squamous Epithelials Urine 1 <=1 /HPF   CBC with platelets    Collection Time: 08/26/22  6:57 AM   Result Value Ref Range    WBC Count 4.3 4.0 - 11.0 10e3/uL    RBC Count 4.71 3.80 - 5.20 10e6/uL    Hemoglobin 14.0 11.7 - 15.7 g/dL    Hematocrit 41.7 35.0 - 47.0 %    MCV 89 78 - 100 fL    MCH 29.7 26.5 - 33.0 pg    MCHC 33.6 31.5 - 36.5 g/dL    RDW 13.2 10.0 - 15.0 %    Platelet Count 295 150 - 450 10e3/uL   Partial thromboplastin time    Collection Time: 08/26/22  6:57 AM   Result Value Ref Range    aPTT 27 22 - 38 Seconds   INR    Collection Time: 08/26/22  6:57 AM   Result Value Ref Range    INR 1.01 0.85 - 1.15   Hemoglobin A1c    Collection Time: 08/26/22  6:57 AM   Result Value Ref Range    Hemoglobin A1C 5.2 0.0 - 5.6 %   Phosphorus    Collection Time: 08/26/22  6:57 AM   Result Value Ref Range    Phosphorus 3.6 2.5 - 4.5 mg/dL   Uric acid    Collection Time: 08/26/22  6:57 AM   Result Value Ref Range    Uric Acid 3.2 2.6 - 6.0 mg/dL   Lipid Profile    Collection Time: 08/26/22  6:57 AM   Result Value Ref Range    Cholesterol 195 <200 mg/dL    Triglycerides 79 <150 mg/dL    Direct Measure HDL 85 >=50 mg/dL    LDL Cholesterol Calculated 94 <=100 mg/dL    Non HDL Cholesterol 110 <130 mg/dL    Patient Fasting > 8hrs? Yes    Comprehensive metabolic panel    Collection Time: 08/26/22  6:57 AM   Result Value Ref Range    Sodium 136 133 - 144 mmol/L    Potassium 3.5 3.4 - 5.3 mmol/L    Chloride 106 94 - 109 mmol/L    Carbon Dioxide (CO2) 28 20 - 32 mmol/L    Anion Gap 2 (L) 3 - 14 mmol/L    Urea Nitrogen 10 7 - 30 mg/dL    Creatinine 0.86 0.52 - 1.04 mg/dL    Calcium 8.8 8.5 - 10.1 mg/dL    Glucose 91 70 - 99 mg/dL    Alkaline Phosphatase 48 40 - 150 U/L    AST 14 0 - 45 U/L    ALT 13 0 - 50 U/L    Protein Total 7.2 6.8 - 8.8 g/dL    Albumin 3.9 3.4 - 5.0 g/dL     Bilirubin Total 0.7 0.2 - 1.3 mg/dL    GFR Estimate 81 >60 mL/min/1.73m2   Quantiferon TB Gold Plus Grey Tube    Collection Time: 08/26/22  6:57 AM    Specimen: Arm, Right; Blood   Result Value Ref Range    Quantiferon Nil Tube 0.09 IU/mL   Quantiferon TB Gold Plus Green Tube    Collection Time: 08/26/22  6:57 AM    Specimen: Arm, Right; Blood   Result Value Ref Range    Quantiferon TB1 Tube 0.11 IU/mL   Quantiferon TB Gold Plus Yellow Tube    Collection Time: 08/26/22  6:57 AM    Specimen: Arm, Right; Blood   Result Value Ref Range    Quantiferon TB2 Tube 0.10    Quantiferon TB Gold Plus Purple Tube    Collection Time: 08/26/22  6:57 AM    Specimen: Arm, Right; Blood   Result Value Ref Range    Quantiferon Mitogen 10.00 IU/mL   Adult Type and Screen    Collection Time: 08/26/22  6:57 AM   Result Value Ref Range    ABO/RH(D) O POS     Antibody Screen Negative Negative    SPECIMEN EXPIRATION DATE [id number]    Quantiferon TB Gold Plus    Collection Time: 08/26/22  6:57 AM    Specimen: Arm, Right; Blood   Result Value Ref Range    Quantiferon-TB Gold Plus Negative Negative    TB1 Ag minus Nil Value 0.02 IU/mL    TB2 Ag minus Nil Value 0.01 IU/mL    Mitogen minus Nil Result 9.91 IU/mL    Nil Result 0.09 IU/mL   ABO and Rh    Collection Time: 08/26/22  7:26 AM   Result Value Ref Range    ABO/RH(D) O POS     SPECIMEN EXPIRATION DATE [id number]    Iohexol 1st Order    Collection Time: 08/26/22  9:37 AM   Result Value Ref Range    Iohexol Time 1 7.66 mg/dL    Iohexol Time 2 3.15 mg/dL    Iohexol Body Surface Area 1.743 m2    Iohexol Raw Clearance 102 mL/min    Iohexol Std Clearance 101 /1.73 m2   EKG 12-lead complete w/read - Clinics    Collection Time: 08/26/22 11:37 AM   Result Value Ref Range    Systolic Blood Pressure  mmHg    Diastolic Blood Pressure  mmHg    Ventricular Rate 66 BPM    Atrial Rate 66 BPM    AR Interval 214 ms    QRS Duration 84 ms     ms    QTc 404 ms    P Axis 78 degrees    R AXIS 71  degrees    T Axis 68 degrees    Interpretation ECG       Sinus rhythm with 1st degree A-V block  Biatrial enlargement  Nonspecific ST and T wave abnormality  Abnormal ECG  When compared with ECG of 19-APR-2013 09:25,  Nonspecific T wave abnormality now evident in Anterior leads  Confirmed by MD SHAKIRA, SORIN ([id number]) on 8/29/2022 4:33:11 PM     Echocardiogram Complete    Collection Time: 08/26/22  3:26 PM   Result Value Ref Range    LVEF  60-65%

## 2022-09-01 ENCOUNTER — TELEPHONE (OUTPATIENT)
Dept: TRANSPLANT | Facility: CLINIC | Age: 52
End: 2022-09-01

## 2022-09-01 NOTE — TELEPHONE ENCOUNTER
I called the patient to review the findings from her donor evaluation.  I reviewed that the team would like her to do a 24 hour blood pressure monitor.  I answered her questions.  I have generated [employer] request for the cuff.  I asked her to let me know when she completes it.  I also reviewed the results of her CT and noted that Dr Dominguez has reviewed her CT images and there is no concerns and nothing further needed as far as the comment by radiologist regarding possible nutcracker.    I will monitor for the blood pressure report and re present when it is available.  Soha Jackson RN  Living Donor Coordinator  09/01/2022 9:57 AM

## 2022-09-04 ENCOUNTER — TRANSFERRED RECORDS (OUTPATIENT)
Dept: HEALTH INFORMATION MANAGEMENT | Facility: CLINIC | Age: 52
End: 2022-09-04

## 2022-09-14 ENCOUNTER — COMMITTEE REVIEW (OUTPATIENT)
Dept: TRANSPLANT | Facility: CLINIC | Age: 52
End: 2022-09-14

## 2022-09-14 NOTE — COMMITTEE REVIEW
Living Donor Committee Review Note Evaluation Date: 8/26/2022  Committee Review Date: 9/14/2022    Donor being evaluated for: Kidney    Transplant Phase: Evaluation  Transplant Status: Active    Transplant Coordinator: Soha Jackson  Transplant Surgeon:   Berto Mckeon    Committee Review Members:  Independent Living Donor Advocate Jumana Dalal, [facility]   Nephrology Nata Tinajero MD, Dwayne Asencio MD   Nutrition Yovana Louis, AMALIA   Pharmacist Leticia Clinton, [facility]    - Clinical Melissa Gordon, [facility]   Transplant Shy Monserrat Frye, RN, Krissy Zafar, RN, Floridalma Gonzalez, RN, Modesta Hoffmann, RN, Kecia Rosas LPN, Soha Jackson RN, Sylwia Cates RN, Estrellita Goldsmith RN   Transplant Surgery Berto Mckeon MD       Transplant Eligibility: Acceptable Mental Health    Committee Review Decision: Needs Re-presentation    Relative Contraindications: None    Absolute Contraindications:     Committee Chair Berto Mckeon MD verbally attested to the committee's decision.    Committee Discussion Details: Reviewed 24 Hour Blood Pressure report findings of: Overall 139/89  Day 141/91  /77  Recommendation is for her to see PCP and be placed on Anti-HTN med and demonstrate stability for 3-6 months.  Soha Jackson RN  Living Donor Coordinator  09/14/2022 8:40 AM

## 2022-09-15 ENCOUNTER — TELEPHONE (OUTPATIENT)
Dept: TRANSPLANT | Facility: CLINIC | Age: 52
End: 2022-09-15

## 2022-09-15 ENCOUNTER — DOCUMENTATION ONLY (OUTPATIENT)
Dept: TRANSPLANT | Facility: CLINIC | Age: 52
End: 2022-09-15

## 2022-09-15 NOTE — TELEPHONE ENCOUNTER
I reviewed the results of her blood pressure monitor and recommendations from the team.  I stated we would like her to go to PCP and get medica management of her high blood pressure.  I explained we would want her to be stable blood pressures for 3 to 6 months.  I stated she can be on one medication and be stable and meet our criteria to be a donor.  Patient request I reach out to Dr Kimball at [facility].  I have called their clinic and spoke to the Nurse and faxed the blood pressure report.  Lori stated she will call to schedule an appointment as soon as possible.  Patient stated understanding and is in agreement to the POC.  Soha Jackson RN  Living Donor Coordinator  09/15/2022 10:09 AM

## 2022-10-15 ENCOUNTER — HEALTH MAINTENANCE LETTER (OUTPATIENT)
Age: 52
End: 2022-10-15

## 2023-02-23 ENCOUNTER — TELEPHONE (OUTPATIENT)
Dept: TRANSPLANT | Facility: CLINIC | Age: 53
End: 2023-02-23
Payer: COMMERCIAL

## 2023-04-04 ENCOUNTER — TELEPHONE (OUTPATIENT)
Dept: TRANSPLANT | Facility: CLINIC | Age: 53
End: 2023-04-04
Payer: COMMERCIAL

## 2023-04-04 NOTE — TELEPHONE ENCOUNTER
"KIRAN reached out to Lori for a psychosocial check in due to wanting to be a NDD. She has had a hx of depression, noted in previous SW notes.     Lori reports that she is still interested in being a NDD. She continues to take Wellbutrin that is managed by her OBGYN. No medication changes recently. She states that her mood goes \"up and down\" and thinks there is a seasonal component. She admits that some mornings it is \"hard to get up\" but she does and her mood is not impacting her functioning. She is not engaged in therapy-- sh admits that it would likely help, but she is worried about finding the right person that she clicks with. 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 She may consider therapy once this busy season is over. KIRAN encouraged her to do so. No SI or other mood concerns. No anxiety or panic attacks.     Her  is supportive of her want to donate and will be her caregiver. She has not yet talked to work about donation, but thinks they will work with her getting time off. Lori reports that she would not want to move forward with this process until tax season is over, but sent her coordinator an email and voicemail about next steps.     Jennifer Hernandes, [facility] HospitalKIRAN, CCTSW   Living Donor    [place], [facility], [facility]  Direct: [phone]  E-Mail: [email or web address]        "

## 2023-04-05 ENCOUNTER — COMMITTEE REVIEW (OUTPATIENT)
Dept: TRANSPLANT | Facility: CLINIC | Age: 53
End: 2023-04-05
Payer: COMMERCIAL

## 2023-04-06 ENCOUNTER — TELEPHONE (OUTPATIENT)
Dept: TRANSPLANT | Facility: CLINIC | Age: 53
End: 2023-04-06
Payer: COMMERCIAL

## 2023-04-06 NOTE — TELEPHONE ENCOUNTER
Today I spoke to the patient and reviewed the results of her donor evaluation and answered all her questions.  I told her she is approved to donate.  I encouraged her to take the time she needs to make her decision.  Patient affirmed her desire to be a donor.  She said her workplace is busy right now due to tax season.  She will completed the forms that I will send her for KPD and Voucher programs.  I will send her 3G tissue typing swab kit.  She will discuss her leave finances further with her workplace and let me know if she needs to file for Donor Shield.  I initiated the self registration of Family voucher program.  Timeline she is thinking is Early June for her donation.  I asked her to give me first available date and last available dates.  I explained logistics and match offer process.  I explained preop scheduling and how the day of a KPD event is set up. I encouraged her to reach out with any questions.  Patient stated understanding and is in agreement to the plan of care.  Soha Jackson RN  Living Donor Coordinator  04/06/2023 4:13 PM

## 2023-04-06 NOTE — COMMITTEE REVIEW
Living Donor Committee Review Note Evaluation Date: 8/26/2022  Committee Review Date: 4/5/2023    Donor being evaluated for: Kidney    Transplant Phase: Evaluation  Transplant Status: Active    Transplant Coordinator: Soha Jackson  Transplant Surgeon:       Committee Review Members:  Immunology Nito Christianson, PhD, Missy Herring   Independent Living Donor Advocate Anna Best, [facility], Jumana Dalal, [facility]   Nephrology Josue Carlos MD, Nata Tinajero MD   Nutrition Yovana Louis,    Pharmacist Jake Arredondo, [facility], Leticia Clinton, [facility]    - Clinical Jennifer Hernandes   Transplant Dickson Cowan MD, Krissy Zafar, RN, Floridalma Gonzalez, RN, Kecia Rosas LPN, Soha Jackson, KASI, Sylwia Cates, KASI, Estrellita Goldsmith, RN   Transplant Surgery Dulce Joyner MD       Transplant Eligibility: Acceptable Physical Health, Acceptable Mental Health    Committee Review Decision: Approved    Relative Contraindications: None    Absolute Contraindications: None    Committee Chair Dulce Joyner MD verbally attested to the committee's decision.    Committee Discussion Details: Reviewed PCP visit notes, patient has been on Losartan 50 Mg daily since Sept and recorded blood pressure reading have demonstrated stability.  Readings acceptable, no further testing needed.  Patient is approved to donate.  Soha Jackson RN  Living Donor Coordinator  04/05/2023 7:22 PM

## 2023-05-02 ENCOUNTER — DOCUMENTATION ONLY (OUTPATIENT)
Dept: TRANSPLANT | Facility: CLINIC | Age: 53
End: 2023-05-02
Payer: COMMERCIAL

## 2023-05-10 NOTE — PROGRESS NOTES
The patient updated me today that her preferred date for donation is August 10.  I will pencil in on surgery calendar.  I will complete her listing and activate her in early July.  Soha Jackson RN  Living Donor Coordinator  05/10/2023 12:26 PM

## 2023-05-18 DIAGNOSIS — Z00.5 EXAMINATION OF POTENTIAL DONOR OF ORGAN AND TISSUE: Primary | ICD-10-CM

## 2023-05-18 PROCEDURE — 81382 HLA II TYPING 1 LOC HR: CPT | Performed by: SURGERY

## 2023-05-23 ENCOUNTER — LAB REQUISITION (OUTPATIENT)
Dept: LAB | Facility: CLINIC | Age: 53
End: 2023-05-23

## 2023-05-23 DIAGNOSIS — Z87.42 PERSONAL HISTORY OF OTHER DISEASES OF THE FEMALE GENITAL TRACT: ICD-10-CM

## 2023-05-23 DIAGNOSIS — E03.9 HYPOTHYROIDISM, UNSPECIFIED: ICD-10-CM

## 2023-05-23 LAB — TSH SERPL DL<=0.005 MIU/L-ACNC: 1.62 UIU/ML (ref 0.3–4.2)

## 2023-05-23 PROCEDURE — 84443 ASSAY THYROID STIM HORMONE: CPT | Performed by: OBSTETRICS & GYNECOLOGY

## 2023-05-23 PROCEDURE — 88305 TISSUE EXAM BY PATHOLOGIST: CPT | Performed by: PATHOLOGY

## 2023-05-25 LAB
PATH REPORT.COMMENTS IMP SPEC: ABNORMAL
PATH REPORT.COMMENTS IMP SPEC: ABNORMAL
PATH REPORT.COMMENTS IMP SPEC: YES
PATH REPORT.FINAL DX SPEC: ABNORMAL
PATH REPORT.GROSS SPEC: ABNORMAL
PATH REPORT.MICROSCOPIC SPEC OTHER STN: ABNORMAL
PATH REPORT.RELEVANT HX SPEC: ABNORMAL
PHOTO IMAGE: ABNORMAL

## 2023-06-11 ENCOUNTER — HEALTH MAINTENANCE LETTER (OUTPATIENT)
Age: 53
End: 2023-06-11

## 2023-07-10 ENCOUNTER — TELEPHONE (OUTPATIENT)
Dept: TRANSPLANT | Facility: CLINIC | Age: 53
End: 2023-07-10
Payer: COMMERCIAL

## 2023-07-10 NOTE — TELEPHONE ENCOUNTER
I had received voicemail that she stated she is ready to be active and would still want August 10 surgery date.  I called her and left a message that the listing should be completed either today or tomorrow.    Soha Jackson RN  Living Donor Coordinator  07/10/2023 1:41 PM

## 2023-07-12 ENCOUNTER — DOCUMENTATION ONLY (OUTPATIENT)
Dept: PHARMACY | Facility: CLINIC | Age: 53
End: 2023-07-12
Payer: COMMERCIAL

## 2023-07-12 NOTE — PHARMACY-MEDICATION REGIMEN REVIEW
Pharmacy Living Kidney Donor Pre-Surgery Medication Evaluation     This patient is a 53 year old female being considered for living kidney donation.  As part of the donor pre-surgery patient evaluation, pharmacy has screened this patient's electronic medical record for medication related concerns.      Assessment / Plan    The following medication related issues may be of possible concern for this patient post surgery, based on the medical record medication list review.     It is reasonable to continue losartan.  If held, restart as soon as clinically  feasible.  Hold for a minimum of 11 hours for general anesthesia.  Do not hold for procedures in which perioperative hypertensive episodes could result in serious complications.  Labile hypertension in which withholding ACE/ARB is likely to result in perioperative severe hypertension.  ACE/ARB use in cardiovascular conditions (e.g. CHF) in which withholding agent could result in acute decompensation.  When in doubt, discuss with anesthesiology.      Pharmacy will continue to participate in this patient's care throughout the surgery course. Please contact pharmacy with any further medication related questions or concerns.

## 2023-07-12 NOTE — TELEPHONE ENCOUNTER
Lori returned my call.  We discussed active listing in [facility], she stated willingness to be active now with date 8/10 preferred.  I will finalize her listing and update her on match offer.  Her voucher family members have not submitted their forms.  I will send the patient the voucher parson consent form.    Soha Jackson RN  Living Donor Coordinator  07/11/2023 8:02 PM

## 2023-07-20 ENCOUNTER — TRANSFERRED RECORDS (OUTPATIENT)
Dept: HEALTH INFORMATION MANAGEMENT | Facility: CLINIC | Age: 53
End: 2023-07-20

## 2023-07-21 ENCOUNTER — TRANSFERRED RECORDS (OUTPATIENT)
Dept: HEALTH INFORMATION MANAGEMENT | Facility: CLINIC | Age: 53
End: 2023-07-21

## 2023-07-26 ENCOUNTER — TELEPHONE (OUTPATIENT)
Dept: TRANSPLANT | Facility: CLINIC | Age: 53
End: 2023-07-26
Payer: COMMERCIAL

## 2023-07-26 NOTE — TELEPHONE ENCOUNTER
I called the patient to check in on how her workup is going for anemia.  She had the endoscopy and they diagnosed her with Hpylori.  She has an appoinment to discuss this.  She is continuing to take her iron supplements.  She would like to look at end of October/early November as her next window for her donation if she is cleared. Her eval labs   so will work with Medical team on plan for updating her eval.  I asked her to let me know how her care is going with PCP.  I answered her questions.  Patient stated understanding and is in agreement to the plan of care.  Soha Jackson RN  Living Donor Coordinator  2023 4:28 PM

## 2023-08-01 ENCOUNTER — TRANSFERRED RECORDS (OUTPATIENT)
Dept: HEALTH INFORMATION MANAGEMENT | Facility: CLINIC | Age: 53
End: 2023-08-01

## 2023-09-11 ENCOUNTER — TELEPHONE (OUTPATIENT)
Dept: TRANSPLANT | Facility: CLINIC | Age: 53
End: 2023-09-11
Payer: COMMERCIAL

## 2023-09-12 NOTE — TELEPHONE ENCOUNTER
I called the patient to review her evaluation and preferences of timing if she is approved.  She updated me that she has had treatment for H Pylori Infection discovered during EGD.  EGD was negative bleeding.  She has been on iron supplements for anemia.  Recheck of Hematology profile in  [facility] on 8/22/23 WBC 3.6 (Low) Hgb= 11.7, hematocrit 37.7.  Iron 57, Iron sat 16%. Ferritin 13.3 (Low).  Which is up from 5.3.  She is seeing [facility], they plan to have her do a stool specimen Oct 3.  She is also working with [facility] and will have a transvaginal Ultrasound next week due to cyst on ovary.  She will need updated labs for donor evaluation.  Will need to send LUDMILA for [facility] records as unable to view in [facility].  Lori states she would prefer not to see the providers again.  Once [facility] records received will review with donor team.  She states desire to donate after her anemia workup is resolved.    Soha Jackson RN  Living Donor Coordinator  09/12/2023 3:18 PM

## 2023-09-14 DIAGNOSIS — Z00.5 TRANSPLANT DONOR EVALUATION: Primary | ICD-10-CM

## 2023-09-14 RX ORDER — EPINEPHRINE 1 MG/ML
0.3 INJECTION, SOLUTION, CONCENTRATE INTRAVENOUS EVERY 5 MIN PRN
Status: CANCELLED | OUTPATIENT
Start: 2023-09-25

## 2023-09-14 RX ORDER — ALBUTEROL SULFATE 90 UG/1
1-2 AEROSOL, METERED RESPIRATORY (INHALATION)
Status: CANCELLED
Start: 2023-09-25

## 2023-09-14 RX ORDER — DIPHENHYDRAMINE HYDROCHLORIDE 50 MG/ML
50 INJECTION INTRAMUSCULAR; INTRAVENOUS
Status: CANCELLED
Start: 2023-09-25

## 2023-09-14 RX ORDER — MEPERIDINE HYDROCHLORIDE 25 MG/ML
25 INJECTION INTRAMUSCULAR; INTRAVENOUS; SUBCUTANEOUS EVERY 30 MIN PRN
Status: CANCELLED | OUTPATIENT
Start: 2023-09-25

## 2023-09-14 RX ORDER — METHYLPREDNISOLONE SODIUM SUCCINATE 125 MG/2ML
125 INJECTION, POWDER, LYOPHILIZED, FOR SOLUTION INTRAMUSCULAR; INTRAVENOUS
Status: CANCELLED
Start: 2023-09-25

## 2023-09-14 RX ORDER — ALBUTEROL SULFATE 0.83 MG/ML
2.5 SOLUTION RESPIRATORY (INHALATION)
Status: CANCELLED | OUTPATIENT
Start: 2023-09-25

## 2023-09-24 LAB
ABO/RH(D): NORMAL
ANTIBODY SCREEN: NEGATIVE
SPECIMEN EXPIRATION DATE: NORMAL

## 2023-09-25 ENCOUNTER — INFUSION THERAPY VISIT (OUTPATIENT)
Dept: INFUSION THERAPY | Facility: CLINIC | Age: 53
End: 2023-09-25
Attending: INTERNAL MEDICINE
Payer: COMMERCIAL

## 2023-09-25 ENCOUNTER — LAB (OUTPATIENT)
Dept: LAB | Facility: CLINIC | Age: 53
End: 2023-09-25
Attending: INTERNAL MEDICINE

## 2023-09-25 VITALS
HEART RATE: 71 BPM | DIASTOLIC BLOOD PRESSURE: 83 MMHG | WEIGHT: 144.7 LBS | TEMPERATURE: 98.2 F | OXYGEN SATURATION: 98 % | SYSTOLIC BLOOD PRESSURE: 151 MMHG | RESPIRATION RATE: 16 BRPM | BODY MASS INDEX: 22 KG/M2

## 2023-09-25 DIAGNOSIS — Z00.5 TRANSPLANT DONOR EVALUATION: Primary | ICD-10-CM

## 2023-09-25 DIAGNOSIS — Z00.5 TRANSPLANT DONOR EVALUATION: ICD-10-CM

## 2023-09-25 LAB
ABO/RH(D): NORMAL
ALBUMIN MFR UR ELPH: 6.3 MG/DL
ALBUMIN SERPL BCG-MCNC: 4.4 G/DL (ref 3.5–5.2)
ALBUMIN UR-MCNC: NEGATIVE MG/DL
ALP SERPL-CCNC: 55 U/L (ref 35–104)
ALT SERPL W P-5'-P-CCNC: 11 U/L (ref 0–50)
ANION GAP SERPL CALCULATED.3IONS-SCNC: 9 MMOL/L (ref 7–15)
APPEARANCE UR: CLEAR
APTT PPP: 30 SECONDS (ref 22–38)
AST SERPL W P-5'-P-CCNC: 20 U/L (ref 0–45)
BILIRUB SERPL-MCNC: 0.5 MG/DL
BILIRUB UR QL STRIP: NEGATIVE
BUN SERPL-MCNC: 10.8 MG/DL (ref 6–20)
CALCIUM SERPL-MCNC: 9.1 MG/DL (ref 8.6–10)
CHLORIDE SERPL-SCNC: 105 MMOL/L (ref 98–107)
CHOLEST SERPL-MCNC: 169 MG/DL
CMV IGG SERPL IA-ACNC: >10 U/ML
CMV IGG SERPL IA-ACNC: ABNORMAL
COLOR UR AUTO: NORMAL
CREAT SERPL-MCNC: 0.87 MG/DL (ref 0.51–0.95)
CREAT UR-MCNC: 76.5 MG/DL
CREAT UR-MCNC: 77.3 MG/DL
DEPRECATED HCO3 PLAS-SCNC: 25 MMOL/L (ref 22–29)
EBV VCA IGG SER IA-ACNC: 578 U/ML
EBV VCA IGG SER IA-ACNC: POSITIVE
EBV VCA IGM SER IA-ACNC: 19 U/ML
EBV VCA IGM SER IA-ACNC: NORMAL
EGFRCR SERPLBLD CKD-EPI 2021: 80 ML/MIN/1.73M2
ERYTHROCYTE [DISTWIDTH] IN BLOOD BY AUTOMATED COUNT: 21 % (ref 10–15)
GLUCOSE SERPL-MCNC: 85 MG/DL (ref 70–99)
GLUCOSE UR STRIP-MCNC: NEGATIVE MG/DL
HBA1C MFR BLD: 5.3 %
HBV CORE AB SERPL QL IA: NONREACTIVE
HBV SURFACE AB SERPL IA-ACNC: 86.6 M[IU]/ML
HBV SURFACE AB SERPL IA-ACNC: REACTIVE M[IU]/ML
HBV SURFACE AG SERPL QL IA: NONREACTIVE
HCG INTACT+B SERPL-ACNC: <1 MIU/ML
HCT VFR BLD AUTO: 41.2 % (ref 35–47)
HCV AB SERPL QL IA: NONREACTIVE
HDLC SERPL-MCNC: 87 MG/DL
HGB BLD-MCNC: 13.6 G/DL (ref 11.7–15.7)
HGB UR QL STRIP: NEGATIVE
HIV 1+2 AB+HIV1 P24 AG SERPL QL IA: NONREACTIVE
INR PPP: 1.22 (ref 0.85–1.15)
KETONES UR STRIP-MCNC: NEGATIVE MG/DL
LDLC SERPL CALC-MCNC: 73 MG/DL
LEUKOCYTE ESTERASE UR QL STRIP: NEGATIVE
MCH RBC QN AUTO: 27.4 PG (ref 26.5–33)
MCHC RBC AUTO-ENTMCNC: 33 G/DL (ref 31.5–36.5)
MCV RBC AUTO: 83 FL (ref 78–100)
MICROALBUMIN UR-MCNC: <12 MG/L
MICROALBUMIN/CREAT UR: NORMAL MG/G{CREAT}
NITRATE UR QL: NEGATIVE
NONHDLC SERPL-MCNC: 82 MG/DL
PH UR STRIP: 5.5 [PH] (ref 5–7)
PHOSPHATE SERPL-MCNC: 3.3 MG/DL (ref 2.5–4.5)
PLATELET # BLD AUTO: 265 10E3/UL (ref 150–450)
POTASSIUM SERPL-SCNC: 3.6 MMOL/L (ref 3.4–5.3)
PROT SERPL-MCNC: 6.7 G/DL (ref 6.4–8.3)
PROT/CREAT 24H UR: 0.08 MG/MG CR (ref 0–0.2)
RBC # BLD AUTO: 4.97 10E6/UL (ref 3.8–5.2)
RBC URINE: 0 /HPF
SODIUM SERPL-SCNC: 139 MMOL/L (ref 136–145)
SP GR UR STRIP: 1.01 (ref 1–1.03)
SPECIMEN EXPIRATION DATE: NORMAL
SQUAMOUS EPITHELIAL: 1 /HPF
T PALLIDUM AB SER QL: NONREACTIVE
TRIGL SERPL-MCNC: 45 MG/DL
URATE SERPL-MCNC: 3.6 MG/DL (ref 2.4–5.7)
UROBILINOGEN UR STRIP-MCNC: NORMAL MG/DL
WBC # BLD AUTO: 3.9 10E3/UL (ref 4–11)
WBC URINE: 1 /HPF

## 2023-09-25 PROCEDURE — 86901 BLOOD TYPING SEROLOGIC RH(D): CPT

## 2023-09-25 PROCEDURE — 36415 COLL VENOUS BLD VENIPUNCTURE: CPT

## 2023-09-25 PROCEDURE — 86665 EPSTEIN-BARR CAPSID VCA: CPT

## 2023-09-25 PROCEDURE — 84155 ASSAY OF PROTEIN SERUM: CPT

## 2023-09-25 PROCEDURE — 85730 THROMBOPLASTIN TIME PARTIAL: CPT

## 2023-09-25 PROCEDURE — 80061 LIPID PANEL: CPT

## 2023-09-25 PROCEDURE — 82542 COL CHROMOTOGRAPHY QUAL/QUAN: CPT

## 2023-09-25 PROCEDURE — 86704 HEP B CORE ANTIBODY TOTAL: CPT

## 2023-09-25 PROCEDURE — 83036 HEMOGLOBIN GLYCOSYLATED A1C: CPT

## 2023-09-25 PROCEDURE — 84702 CHORIONIC GONADOTROPIN TEST: CPT

## 2023-09-25 PROCEDURE — 86850 RBC ANTIBODY SCREEN: CPT

## 2023-09-25 PROCEDURE — 85610 PROTHROMBIN TIME: CPT

## 2023-09-25 PROCEDURE — 81001 URINALYSIS AUTO W/SCOPE: CPT

## 2023-09-25 PROCEDURE — 86644 CMV ANTIBODY: CPT

## 2023-09-25 PROCEDURE — 84100 ASSAY OF PHOSPHORUS: CPT

## 2023-09-25 PROCEDURE — 87340 HEPATITIS B SURFACE AG IA: CPT

## 2023-09-25 PROCEDURE — 93000 ELECTROCARDIOGRAM COMPLETE: CPT | Performed by: INTERNAL MEDICINE

## 2023-09-25 PROCEDURE — 84156 ASSAY OF PROTEIN URINE: CPT

## 2023-09-25 PROCEDURE — 86780 TREPONEMA PALLIDUM: CPT

## 2023-09-25 PROCEDURE — 86706 HEP B SURFACE ANTIBODY: CPT

## 2023-09-25 PROCEDURE — 86481 TB AG RESPONSE T-CELL SUSP: CPT

## 2023-09-25 PROCEDURE — 85027 COMPLETE CBC AUTOMATED: CPT

## 2023-09-25 PROCEDURE — 86789 WEST NILE VIRUS ANTIBODY: CPT

## 2023-09-25 PROCEDURE — 86803 HEPATITIS C AB TEST: CPT

## 2023-09-25 PROCEDURE — 82570 ASSAY OF URINE CREATININE: CPT

## 2023-09-25 PROCEDURE — 255N000002 HC RX 255 OP 636: Performed by: INTERNAL MEDICINE

## 2023-09-25 PROCEDURE — 84550 ASSAY OF BLOOD/URIC ACID: CPT

## 2023-09-25 RX ORDER — METHYLPREDNISOLONE SODIUM SUCCINATE 125 MG/2ML
125 INJECTION, POWDER, LYOPHILIZED, FOR SOLUTION INTRAMUSCULAR; INTRAVENOUS
Status: CANCELLED
Start: 2023-09-25

## 2023-09-25 RX ORDER — DIPHENHYDRAMINE HYDROCHLORIDE 50 MG/ML
50 INJECTION INTRAMUSCULAR; INTRAVENOUS
Status: CANCELLED
Start: 2023-09-25

## 2023-09-25 RX ORDER — ALBUTEROL SULFATE 90 UG/1
1-2 AEROSOL, METERED RESPIRATORY (INHALATION)
Status: CANCELLED
Start: 2023-09-25

## 2023-09-25 RX ORDER — MEPERIDINE HYDROCHLORIDE 25 MG/ML
25 INJECTION INTRAMUSCULAR; INTRAVENOUS; SUBCUTANEOUS EVERY 30 MIN PRN
Status: CANCELLED | OUTPATIENT
Start: 2023-09-25

## 2023-09-25 RX ORDER — ALBUTEROL SULFATE 0.83 MG/ML
2.5 SOLUTION RESPIRATORY (INHALATION)
Status: CANCELLED | OUTPATIENT
Start: 2023-09-25

## 2023-09-25 RX ORDER — EPINEPHRINE 1 MG/ML
0.3 INJECTION, SOLUTION INTRAMUSCULAR; SUBCUTANEOUS EVERY 5 MIN PRN
Status: CANCELLED | OUTPATIENT
Start: 2023-09-25

## 2023-09-25 RX ADMIN — IOHEXOL 4 ML: 300 INJECTION, SOLUTION INTRAVENOUS at 09:28

## 2023-09-25 ASSESSMENT — PAIN SCALES - GENERAL: PAINLEVEL: NO PAIN (0)

## 2023-09-25 NOTE — PATIENT INSTRUCTIONS
Dear Lori Harris    Thank you for choosing [facility] ([facility]) for your infusion and blood collection.  The following information is a summary of our appointment as well as important reminders.          If you are a transplant patient and require transplant education, please click on this link: [email or web address].    We look forward in seeing you on your next appointment here at [facility] ([facility]).  Please don t hesitate to call us at [phone] to reschedule any of your appointments or to speak with one of the [facility] registered nurses.  It was a pleasure taking care of you today.    Sincerely,    [facility]  [address] [place] [address]  [id number]  Phone:  [phone]

## 2023-09-25 NOTE — PROGRESS NOTES
Donor Iohexol test    Lori Harris presents today to [facility] for a Donor Iohexol test.      Progress note:  ID verified by name and .     The following information was verified with the patient:  Female Patients is there any possibility of being pregnant No  Is there a history of allergy (skin rash, swelling, ect) to:   A.  Iodine (except skin reactions to betadine): No   B. Intravenous radio-contrast agents: No   C. Seafood Yes--Scallops or Crab meat allergy. Patient was not sure whether scallops or crab meat. Per patient,   last May 2023 after eating those she had a little hives on her face and arms, but it resolved after an hour. Paged Dr. Asencio and said ok to give the the Iohexol.     present during visit today: Not Applicable.    R.N. provided patient with educational handout regarding timed test. Yes    2 PIV line placed in Lab(Right and Left lower arm). Iohexol administered over 2 minutes in left lower arm PIV.  Positive blood return verified before and after injection. Left lower arm PIV removed after. ABO and RH lab collected on the Right lower arm PIV.    Medication administered:  Iohexol (Omnipaque 300mg iodine/ml concentration) 4 mls.    Start time: 932  Stop time: 934    Drug Waste Record    Drug Name: Iohexol  Dose: 300 mg  Route administered: IV  NDC #: 0407-1413-10  Amount of waste(mL):6 ml  Reason for waste: Single use vial    Administrations This Visit       iohexol (OMNIPAQUE) 300 mg/mL injection 4 mL       Admin Date  2023 Action  $Given Dose  4 mL Route  Intravenous Administered By  Ileana Schmid RN                        [facility] nurse draw labs at 2 (11:34) and 4 (13:34) hours post iohexol administration. Patient was discharged instructed to come back on these time for Iohexol blood collection.  Patient given a slip with the times to get labs drawn and verbalized understanding of the plan.    Patient tolerated the procedure:  Yes    Patient was discharged in stable  condition.      BP (!) 151/83 (BP Location: Right arm, Patient Position: Sitting, Cuff Size: Adult Regular)   Pulse 71   Temp 98.2  F (36.8  C)   Resp 16   Wt 65.6 kg (144 lb 11.2 oz)   SpO2 98%   BMI 22.00 kg/m       Ileana Schmid RN

## 2023-09-25 NOTE — NURSING NOTE
Chief Complaint   Patient presents with    Labs Only     Venipuncture, vitals checked       PIV placed by JAZZ Gracia RN on 9/25/2023 at 8:42 AM

## 2023-09-26 LAB
GAMMA INTERFERON BACKGROUND BLD IA-ACNC: 0.21 IU/ML
M TB IFN-G BLD-IMP: NEGATIVE
M TB IFN-G CD4+ BCKGRND COR BLD-ACNC: 9.79 IU/ML
MITOGEN IGNF BCKGRD COR BLD-ACNC: -0.05 IU/ML
MITOGEN IGNF BCKGRD COR BLD-ACNC: -0.05 IU/ML
QUANTIFERON MITOGEN: 10 IU/ML
QUANTIFERON NIL TUBE: 0.21 IU/ML
QUANTIFERON TB1 TUBE: 0.16 IU/ML
QUANTIFERON TB2 TUBE: 0.16

## 2023-09-27 LAB
ATRIAL RATE - MUSE: 60 BPM
DIASTOLIC BLOOD PRESSURE - MUSE: NORMAL MMHG
INTERPRETATION ECG - MUSE: NORMAL
P AXIS - MUSE: 75 DEGREES
PR INTERVAL - MUSE: 232 MS
QRS DURATION - MUSE: 82 MS
QT - MUSE: 402 MS
QTC - MUSE: 402 MS
R AXIS - MUSE: 70 DEGREES
SYSTOLIC BLOOD PRESSURE - MUSE: NORMAL MMHG
T AXIS - MUSE: 66 DEGREES
VENTRICULAR RATE- MUSE: 60 BPM

## 2023-09-28 LAB
WNV IGG SER IA-ACNC: 0.24 IV
WNV IGM SER IA-ACNC: 0 IV

## 2023-10-03 LAB
BSA: 1.77 M2
IOHEXOL CL UR+SERPL-VRATE: 100 /1.73 M2
IOHEXOL CL UR+SERPL-VRATE: 103 ML/MIN
IOHEXOL CL UR+SERPL-VRATE: 3.02 MG/DL
IOHEXOL CL UR+SERPL-VRATE: 6.25 MG/DL

## 2023-10-06 ENCOUNTER — TELEPHONE (OUTPATIENT)
Dept: TRANSPLANT | Facility: CLINIC | Age: 53
End: 2023-10-06
Payer: COMMERCIAL

## 2023-10-06 DIAGNOSIS — Z00.5 EXAMINATION OF POTENTIAL DONOR OF ORGAN AND TISSUE: Primary | ICD-10-CM

## 2023-10-06 NOTE — TELEPHONE ENCOUNTER
I called the patient to review plan for next steps.  She voices motivation to donate in early November.  She had eye exam two weeks ago and is completing her care with [facility] by submitting a stool sample on MOnday.  She will come Tuesday to repeat INR and WBC count which were slightly off.  I have sent her Two LUDMILA's ([facility] and eye exam) record requests.  She and her PCP lowered her losartan to 25 mg once daily due to some light headedness.  I will gather records and present her next week at [facility].  Soha Jackson RN  Living Donor Coordinator  10/06/2023 3:58 PM

## 2023-10-09 ENCOUNTER — LAB (OUTPATIENT)
Dept: LAB | Facility: CLINIC | Age: 53
End: 2023-10-09
Payer: COMMERCIAL

## 2023-10-09 DIAGNOSIS — Z00.5 EXAMINATION OF POTENTIAL DONOR OF ORGAN AND TISSUE: ICD-10-CM

## 2023-10-09 LAB
INR PPP: 0.99 (ref 0.85–1.15)
WBC # BLD AUTO: 5.5 10E3/UL (ref 4–11)

## 2023-10-09 PROCEDURE — 85048 AUTOMATED LEUKOCYTE COUNT: CPT

## 2023-10-09 PROCEDURE — 85610 PROTHROMBIN TIME: CPT

## 2023-10-09 PROCEDURE — 36415 COLL VENOUS BLD VENIPUNCTURE: CPT

## 2023-10-10 ENCOUNTER — TRANSFERRED RECORDS (OUTPATIENT)
Dept: HEALTH INFORMATION MANAGEMENT | Facility: CLINIC | Age: 53
End: 2023-10-10
Payer: COMMERCIAL

## 2023-10-17 ENCOUNTER — TELEPHONE (OUTPATIENT)
Dept: TRANSPLANT | Facility: CLINIC | Age: 53
End: 2023-10-17
Payer: COMMERCIAL

## 2023-10-18 ENCOUNTER — COMMITTEE REVIEW (OUTPATIENT)
Dept: TRANSPLANT | Facility: CLINIC | Age: 53
End: 2023-10-18
Payer: COMMERCIAL

## 2023-10-19 ENCOUNTER — TELEPHONE (OUTPATIENT)
Dept: TRANSPLANT | Facility: CLINIC | Age: 53
End: 2023-10-19
Payer: COMMERCIAL

## 2023-10-19 NOTE — COMMITTEE REVIEW
Abdominal Committee Review Note     Evaluation Date:   Committee Review Date: 10/18/2023    Organ being evaluated for: Kidney    Transplant Phase:   Transplant Status:     Transplant Coordinator: No matching coordinators  Transplant Surgeon:       Referring Physician:     Primary Diagnosis:   Secondary Diagnosis:     Committee Review Members:  Independent Living Donor Advocate Anna Best, [facility], Jennifer Hernandes   Nephrology Josue Carlos MD, Dwayne Asencio MD   Nutrition Yovana Louis, AMALIA   Pharmacist Sylwia Nixon, [facility]    - Clinical Krys Hernandes [facility]   Transplant Shy Monserrat Frye, RN, Krissy Zafar, RN, Floridalma Gonzalez, RN, Modesta Hoffmann, RN, Sylwia Cates, RN, Estrellita Goldsmith RN   Transplant Surgery Cortes Hammond MD       Transplant Eligibility: Acceptable Physical Health, Acceptable Mental Health    Committee Review Decision: Approved    Relative Contraindications: None    Absolute Contraindications: None    Committee Chair Cortes Hammond MD verbally attested to the committee's decision.    Committee Discussion Details:     Reviewed Lori's health updates since her approval on 4/5/2023.  PCP Visits: 7/12/23, 7/13/23 (telephone visit), 8/22/23: Lori was feeling light-headed after donating blood.  Had self-decreased Losartan from 50mg/day to 25mg/day.  PCP ok with 25mg/day.  Labs checked and Lori found to be iron deficient with low iron, low ferritin, and anemia 10.2.  Lori denied GI symptoms, melena, hematochezia, nosebleeds.  Started on Ferrous Sulfate 325mb BID and EGD.   GI Visits: 7/20/23, 10/13/23: EGD WNL except +H-pylori on bxp, treated with Omeprazole, Clarithromycin, and Amoxicillin.  Follow-up stool testing after treatment negative for H-pylori Ag.  No further testing/work-up indicated.  Due for colonoscopy in 2025.  GYN Visits: 6/13/23, 7/18/23, and 9/19/23: simple ovarian cyst, irregular menstrual spotting (IUD in correct place), no additional concerns from Gyn r/t  anemia or kidney donation     Anemia responding to iron supplementation.  Hgb, Ferritin, and Iron studies continue to trend up.  Last hgb 13.6 on 9/25/23.  Slightly hypertensive at Iohexol re-test, per Nephrology it could be white coat vs. the reduction in Losartan dosing (which Lori's symptoms of lightheadedness at that time may have been due to the blood donation and volume loss vs. Medication dosing).  Have Lori continue to check home BP logs and titrate Losartan with PCP if indicated.      Lori re-approved for kidney donation.    Fabiola Frye RN, BSN, CCTN  Living Donor Coordinator  [phone]

## 2023-10-19 NOTE — TELEPHONE ENCOUNTER
Reviewed Lori's living donor committee review.  PCP, GI, and Gyn visits, testing, and results reviewed (see committee note and care everywhere for details).  No further testing indicated.  Lori re-approved for living donation.    Reviewed that her BPs a the time of Iohexol re-testing in September were slightly elevated.  Per Nephrology it could be white coat vs. the reduction in Losartan dosing (which Lori's symptoms of lightheadedness at that time may have been due to the blood donation and volume loss vs. Medication dosing).  Lori reports her BP has been checked frequently since Losartan was decreased to 25mg and has been WNL (per care everywhere, PCP visits show: 111/80 and 120/72).  Reviewed importance of BP monitoring for her lifetime.  Lori will continue to check BP and titrate Losartan with PCP if indicated.  Lori very excited she can move forward with donation.  Briefly reviewed the process for activation in [facility] and her preferred time window for donation.  Lori verbalized understanding that pending the recipient selection and logistics it is hard to predict the date for donation but that we should know ~3 weeks prior to donation which she reports is enough time for her to notify work and make arrangements for her leave.  Lori has my contact info should she have any questions before her coordinator returns on Monday.    Fabiola Frye RN, BSN, CCTN  Living Donor Coordinator  [phone]

## 2023-10-27 ENCOUNTER — TELEPHONE (OUTPATIENT)
Dept: TRANSPLANT | Facility: CLINIC | Age: 53
End: 2023-10-27
Payer: COMMERCIAL

## 2023-10-27 DIAGNOSIS — Z00.5 EXAMINATION OF POTENTIAL DONOR OF ORGAN AND TISSUE: Primary | ICD-10-CM

## 2023-10-27 DIAGNOSIS — Z52.4 ENCOUNTER FOR DONATION OF KIDNEY: ICD-10-CM

## 2023-10-27 NOTE — TELEPHONE ENCOUNTER
Called Lori to inform the patient we received a match offer in Kidney Paired Exchange from the [employer].  Reviewed the swap configuration and proposed surgery date of 11/15/23.  Reviewed her current health status, no changes noted.  Informed the patient of the additional blood draw requirements (kits for serology and final XM), the pre-op appointment 11/6/23, and the expected duration for surgery, hospitiliization, and recovery. Questions answered.  The patient verbalized understanding and agreement with above information.  I told the patient we would most likely have an update on the offer's viability and progression by next week.    Information that I will email the patient: Preop instructions and surgery letter.     The patient knows how to get a hold of me in the meantime if she has any additional questions/concerns.  Soha Jackson RN  Living Donor Coordinator  10/27/2023 5:09 PM

## 2023-11-05 ENCOUNTER — DOCUMENTATION ONLY (OUTPATIENT)
Dept: TRANSPLANT | Facility: CLINIC | Age: 53
End: 2023-11-05
Payer: COMMERCIAL

## 2023-11-05 LAB
ABO/RH(D): NORMAL
ANTIBODY SCREEN: NEGATIVE
SPECIMEN EXPIRATION DATE: NORMAL

## 2023-11-06 ENCOUNTER — OFFICE VISIT (OUTPATIENT)
Dept: TRANSPLANT | Facility: CLINIC | Age: 53
End: 2023-11-06
Attending: SURGERY

## 2023-11-06 ENCOUNTER — ALLIED HEALTH/NURSE VISIT (OUTPATIENT)
Dept: TRANSPLANT | Facility: CLINIC | Age: 53
End: 2023-11-06
Attending: SURGERY

## 2023-11-06 ENCOUNTER — ANCILLARY PROCEDURE (OUTPATIENT)
Dept: GENERAL RADIOLOGY | Facility: CLINIC | Age: 53
End: 2023-11-06
Attending: SURGERY
Payer: COMMERCIAL

## 2023-11-06 ENCOUNTER — LAB (OUTPATIENT)
Dept: LAB | Facility: CLINIC | Age: 53
End: 2023-11-06
Attending: SURGERY
Payer: COMMERCIAL

## 2023-11-06 VITALS — HEART RATE: 85 BPM | DIASTOLIC BLOOD PRESSURE: 79 MMHG | OXYGEN SATURATION: 97 % | SYSTOLIC BLOOD PRESSURE: 160 MMHG

## 2023-11-06 DIAGNOSIS — Z00.5 EXAMINATION OF POTENTIAL DONOR OF ORGAN AND TISSUE: ICD-10-CM

## 2023-11-06 LAB
ALBUMIN UR-MCNC: NEGATIVE MG/DL
APPEARANCE UR: CLEAR
BILIRUB UR QL STRIP: NEGATIVE
COLOR UR AUTO: ABNORMAL
CREAT SERPL-MCNC: 0.74 MG/DL (ref 0.51–0.95)
EGFRCR SERPLBLD CKD-EPI 2021: >90 ML/MIN/1.73M2
GLUCOSE UR STRIP-MCNC: NEGATIVE MG/DL
HCG INTACT+B SERPL-ACNC: <1 MIU/ML
HGB BLD-MCNC: 14.4 G/DL (ref 11.7–15.7)
HGB UR QL STRIP: NEGATIVE
KETONES UR STRIP-MCNC: NEGATIVE MG/DL
LEUKOCYTE ESTERASE UR QL STRIP: NEGATIVE
NITRATE UR QL: NEGATIVE
PH UR STRIP: 6.5 [PH] (ref 5–7)
RBC URINE: 1 /HPF
SP GR UR STRIP: 1 (ref 1–1.03)
SQUAMOUS EPITHELIAL: 1 /HPF
UROBILINOGEN UR STRIP-MCNC: NORMAL MG/DL
WBC URINE: 1 /HPF

## 2023-11-06 PROCEDURE — 81001 URINALYSIS AUTO W/SCOPE: CPT | Performed by: PATHOLOGY

## 2023-11-06 PROCEDURE — 84702 CHORIONIC GONADOTROPIN TEST: CPT | Performed by: PATHOLOGY

## 2023-11-06 PROCEDURE — 99213 OFFICE O/P EST LOW 20 MIN: CPT | Performed by: SURGERY

## 2023-11-06 PROCEDURE — 82565 ASSAY OF CREATININE: CPT | Performed by: PATHOLOGY

## 2023-11-06 PROCEDURE — 71046 X-RAY EXAM CHEST 2 VIEWS: CPT | Performed by: RADIOLOGY

## 2023-11-06 PROCEDURE — 36415 COLL VENOUS BLD VENIPUNCTURE: CPT | Performed by: PATHOLOGY

## 2023-11-06 PROCEDURE — 85018 HEMOGLOBIN: CPT | Performed by: PATHOLOGY

## 2023-11-06 PROCEDURE — 86850 RBC ANTIBODY SCREEN: CPT | Performed by: SURGERY

## 2023-11-06 PROCEDURE — 86901 BLOOD TYPING SEROLOGIC RH(D): CPT | Performed by: SURGERY

## 2023-11-06 PROCEDURE — 99207 PR NO BILLABLE SERVICE THIS VISIT: CPT | Performed by: NURSE PRACTITIONER

## 2023-11-06 PROCEDURE — 99214 OFFICE O/P EST MOD 30 MIN: CPT | Mod: GC | Performed by: SURGERY

## 2023-11-06 RX ORDER — LOSARTAN POTASSIUM 50 MG/1
25 TABLET ORAL DAILY
COMMUNITY

## 2023-11-06 NOTE — PROGRESS NOTES
Transplant Surgery H&P                                                        HPI:                                                      Ms. Harris is a 53 year old female who comes to clinic today for preop prior to planned laparoscopic kidney donation surgery. The patient was previously reviewed by the living donor multidisciplinary selection committee and found to be medically and psychosocially appropriate for voluntary kidney donation. The patient denies any feelings of being pressured to proceed with kidney donation.  Health events since donor evaluation: Losartan decreased due to lightheadedness, home BP readings now 130s/80. Treated for H.pylori infection. Iron deficiency anemia, was on iron supplements. Hgb normalized.     Special considerations:   Constipation: no  PONV: no  History of Urinary retention? no  Significant neck/back/joint concerns for lateral decubitus positioning?: No  Mandaen: No      MEDICAL HISTORY:                                                      Patient Active Problem List    Diagnosis Date Noted    Tinnitus of left ear 05/22/2018     Priority: Medium    Seasonal affective disorder (H24) 12/06/2016     Priority: Medium    Acquired absence of breast 08/25/2014     Priority: Medium    FH: colon cancer 08/21/2014     Priority: Medium     Overview:   mom  71    colonoscopy   9/23/15   Dr Franco      negative   due  5 yrs       Breast cancer (H) 08/08/2014     Priority: Medium     Overview:   Dx  7/2014      considering options    see oncology       plans  bilat  mastectomy and  reconstruction      see notes     is on tamoxifen  per  Dr Naylor        Hypothyroidism 06/10/2014     Priority: Medium    Persistent disorder of initiating or maintaining sleep 06/05/2013     Priority: Medium    Transplant donor evaluation 03/08/2013     Priority: Medium    Depressive disorder 10/15/2012     Priority: Medium    Migraine without aura 10/15/2012     Priority: Medium    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     Priority: Medium    Headache 01/19/2011     Priority: Medium    Iliotibial band syndrome 04/27/2009     Priority: Medium    Palpitations 10/22/2008     Priority: Medium     Overview:    see   notes  Dr Browne,  10/08        watch                seem  benign  pVC's        First degree atrioventricular block 07/21/2008     Priority: Medium     Overview:   and incomplete   RBBB      on  ekg   7/08        Past Medical History:   Diagnosis Date    Carcinoma in situ of right breast 08/2014    Ductal in situ; Stage 0    Depressive disorder, not elsewhere classified     First degree atrioventricular block     Headaches     Hypothyroidism     Irregular heart beat     Other disorder of muscle, ligament, and fascia     Persistent disorder of initiating or maintaining sleep     Vitamin D deficiency      Past Surgical History:   Procedure Laterality Date    COLONOSCOPY N/A 9/23/2015    Procedure: COLONOSCOPY;  Surgeon: Shy Franco MD;  [facility]:  [facility]    COLONOSCOPY N/A 10/28/2020    Procedure: COLONOSCOPY;  Surgeon: Shy Franco MD;  Location:  GI    DISSECT LYMPH NODE AXILLA Bilateral 8/25/2014    Procedure: DISSECT LYMPH NODE AXILLA;  Surgeon: Renetta Delatorre MD;  Location: [facility]    EXCISE CYST THYROGLOSSAL DUCT  1988    FLUORESCENCE INTRAOPERATIVE VASCULAR ANGIOGRAPHY Bilateral 8/25/2014    Procedure: FLUORESCENCE INTRAOPERATIVE VASCULAR ANGIOGRAPHY;  Surgeon: Jyoti Richardson MD;  Location: [facility]    GRAFT FAT TO BREAST N/A 1/6/2015    Procedure: GRAFT FAT TO BREAST;  Surgeon: Jyoti Richardson MD;  Location: [facility]    INSERT TISSUE EXPANDER BREAST BILATERAL Bilateral 8/25/2014    Procedure: INSERT TISSUE EXPANDER BREAST BILATERAL;  Surgeon: Jyoti Richardson MD;  Location: [facility]    MASTECTOMY, NIPPLE SPARING Bilateral 8/25/2014    Procedure: MASTECTOMY, NIPPLE SPARING;  Surgeon: Renetta Delatorre MD;  [facility]    RECONSTRUCT BREAST BILATERAL, IMPLANT PROSTHESIS BILATERAL, COMBINED Bilateral 1/6/2015    Procedure: COMBINED RECONSTRUCT BREAST BILATERAL, IMPLANT PROSTHESIS BILATERAL;  Surgeon: Jyoti Richardson MD;  Location: [facility]     Current Outpatient Medications   Medication Sig Dispense Refill    buPROPion (WELLBUTRIN XL) 150 MG 24 hr tablet Take 150 mg by mouth every morning      levothyroxine (SYNTHROID, LEVOTHROID) 75 MCG tablet Take 1 tablet by mouth daily.      losartan (COZAAR) 50 MG tablet Take 25 mg by mouth daily      Naratriptan HCl (AMERGE PO) Take 1 mg by mouth every 4 hours as needed for migraine       OTC products: nightly benedryl   Allergies   Allergen Reactions    No Known Drug Allergy     Seafood Hives     Scallops or Crab meat allergy. Patient was not sure whether scallops or crab meat. Per patient last May 2023 after eating those she had a little hives on her face arms, but it resolved after an hour.      Social History     Tobacco Use    Smoking status: Never    Smokeless tobacco: Never   Substance Use Topics    Alcohol use: Yes     Alcohol/week: 9.0 standard drinks of alcohol     Types: 9 Standard drinks or equivalent per week     History   Drug Use No       REVIEW OF SYSTEMS:                                                    CONSTITUTIONAL: NEGATIVE for fever, chills, change in weight  INTEGUMENTARY/SKIN: NEGATIVE for worrisome rashes, moles or lesions  EYES: NEGATIVE for vision changes or irritation  ENT/MOUTH: NEGATIVE for ear, mouth and throat problems  RESP: NEGATIVE for significant cough or SOB  CV: NEGATIVE for chest pain, palpitations or peripheral edema  GI: NEGATIVE for nausea, abdominal pain, heartburn, or change in bowel habits  : NEGATIVE for frequency, dysuria, or hematuria  MUSCULOSKELETAL: NEGATIVE for significant arthralgias or myalgia  NEURO: NEGATIVE for weakness, dizziness or paresthesias  ENDOCRINE: NEGATIVE for temperature intolerance, skin/hair changes  HEME:  NEGATIVE for bleeding problems  PSYCHIATRIC: NEGATIVE for changes in mood or affect    EXAM:                                                    There were no vitals taken for this visit.    GENERAL APPEARANCE: healthy, alert and no distress     EYES: EOMI, PERRL     HENT: ear canals and TM's normal and nose and mouth without ulcers or lesions     NECK: no adenopathy, no asymmetry, masses, or scars and thyroid normal to palpation     RESP: lungs clear to auscultation - no rales, rhonchi or wheezes     CV: regular rates and rhythm, normal S1 S2, no S3 or S4 and no murmur, click or rub     ABDOMEN:  soft, nontender, no HSM or masses and bowel sounds normal     MS: extremities normal- no gross deformities noted, no evidence of inflammation in joints, FROM in all extremities.     SKIN: no suspicious lesions or rashes     NEURO: Normal strength and tone, sensory exam grossly normal, mentation intact and speech normal     PSYCH: mentation appears normal. and affect normal/bright     LYMPHATICS: No cervical adenopathy    DIAGNOSTICS:                                                    EKG:     Sinus rhythm with 1st degree A-V block  Biatrial enlargement  Borderline ECG  When compared with ECG of 26-AUG-2022 11:37,  No significant change was found     Chest Xray: negative for acute changes   Labs Resulted Today:   Results for orders placed or performed in visit on 11/06/23   XR Chest 2 Views     Status: None    Narrative    Chest 2 views    INDICATION: Potential organ donor    COMPARISON: 8/26/2022    FINDINGS: Heart size and shape appear normal. Lungs, pulmonary  vasculature and bony structures appear normal.      Impression    IMPRESSION: Negative    PHUONG FRIED MD         SYSTEM ID:  [id number]   Results for orders placed or performed in visit on 11/06/23   Creatinine     Status: Normal   Result Value Ref Range    Creatinine 0.74 0.51 - 0.95 mg/dL    GFR Estimate >90 >60 mL/min/1.73m2   Hemoglobin     Status: Normal    Result Value Ref Range    Hemoglobin 14.4 11.7 - 15.7 g/dL   UA with Microscopic reflex to Culture     Status: Abnormal    Specimen: Urine, Midstream   Result Value Ref Range    Color Urine Straw Colorless, Straw, Light Yellow, Yellow    Appearance Urine Clear Clear    Glucose Urine Negative Negative mg/dL    Bilirubin Urine Negative Negative    Ketones Urine Negative Negative mg/dL    Specific Gravity Urine 1.002 (L) 1.003 - 1.035    Blood Urine Negative Negative    pH Urine 6.5 5.0 - 7.0    Protein Albumin Urine Negative Negative mg/dL    Urobilinogen Urine Normal Normal, 2.0 mg/dL    Nitrite Urine Negative Negative    Leukocyte Esterase Urine Negative Negative    RBC Urine 1 <=2 /HPF    WBC Urine 1 <=5 /HPF    Squamous Epithelials Urine 1 <=1 /HPF    Narrative    Urine Culture not indicated   HCG quantitative pregnancy     Status: Normal   Result Value Ref Range    hCG Quantitative <1 <5 mIU/mL   Adult Type and Screen     Status: None   Result Value Ref Range    ABO/RH(D) O POS     Antibody Screen Negative Negative    SPECIMEN EXPIRATION DATE [id number]    ABO/Rh type and screen     Status: None    Narrative    The following orders were created for panel order ABO/Rh type and screen.  Procedure                               Abnormality         Status                     ---------                               -----------         ------                     Adult Type and Screen[[id number]]                            Final result                 Please view results for these tests on the individual orders.     Recent Labs   Lab Test 11/06/23  1334 10/09/23  1505 09/25/23  0837 08/26/22  0657   HGB 14.4  --  13.6 14.0   PLT  --   --  265 295   INR  --  0.99 1.22* 1.01   NA  --   --  139 136   POTASSIUM  --   --  3.6 3.5   CR 0.74  --  0.87 0.86   A1C  --   --  5.3 5.2      Assessment:                                                    Healthy voluntary kidney donor    There have been no significant intercurrent  medical problems or change of intent in proceeding with kidney donation as scheduled on 11/15/23.    1. Labs reviewed and within normal limits: Yes  2. EKG (9/25/23):     Sinus rhythm with 1st degree A-V block  Biatrial enlargement  Borderline ECG  When compared with ECG of 26-AUG-2022 11:37,  No significant change was found   ECHO 8/26/22: Global and regional left ventricular function is normal with an EF of 60-65%.  Global right ventricular function is normal.  No valvular abnormalities present.    3. Paired Exchange case: No  4. ABO= O POS  5. Laterality: left  6. Outstanding issues: None. Can take meds the morning of surgery 2 hrs prior to sx time, decrease losartan dose to half for that morning only.     Plan:                                                      1. Consent: Done  2. Outstanding issues: None    Signed Electronically by: HUMBERTO Baum CNP

## 2023-11-06 NOTE — LETTER
11/6/2023         RE: Lori Harris  [address] [facility] [place] [address]        Dear Colleague,    Thank you for referring your patient, Lori Harris, to the [facility]. Please see a copy of my visit note below.    Transplant Surgery H&P                                                        HPI:                                                      Ms. Harris is a 53 year old female who comes to clinic today for preop prior to planned laparoscopic kidney donation surgery. The patient was previously reviewed by the living donor multidisciplinary selection committee and found to be medically and psychosocially appropriate for voluntary kidney donation. The patient denies any feelings of being pressured to proceed with kidney donation.  Health events since donor evaluation: no    Special considerations:   Constipation: no  PONV: no  History of Urinary retention? no  Significant neck/back/joint concerns for lateral decubitus positioning?: No  Mormonism: No    Home Bps 130s/80s, well controlled on losartan. Follows with GP.    MEDICAL HISTORY:                                                      Patient Active Problem List    Diagnosis Date Noted    Tinnitus of left ear 05/22/2018     Priority: Medium    Seasonal affective disorder (H24) 12/06/2016     Priority: Medium    Acquired absence of breast 08/25/2014     Priority: Medium    FH: colon cancer 08/21/2014     Priority: Medium     Overview:   mom  72    colonoscopy   9/23/15   Dr Franco      negative   due  5 yrs       Breast cancer (H) 08/08/2014     Priority: Medium     Overview:   Dx  7/2014      considering options    see oncology       plans  bilat  mastectomy and  reconstruction      see notes     is on tamoxifen  per  Dr Naylor        Hypothyroidism 06/10/2014     Priority: Medium    Persistent disorder of initiating or maintaining sleep 06/05/2013     Priority: Medium    Transplant donor  evaluation 03/08/2013     Priority: Medium    Depressive disorder 10/15/2012     Priority: Medium    Migraine without aura 10/15/2012     Priority: Medium    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     Priority: Medium    Headache 01/19/2011     Priority: Medium    Iliotibial band syndrome 04/27/2009     Priority: Medium    Palpitations 10/22/2008     Priority: Medium     Overview:    see   notes  Dr Browne,  10/08        watch                seem  benign  pVC's        First degree atrioventricular block 07/21/2008     Priority: Medium     Overview:   and incomplete   RBBB      on  ekg   7/08        Past Medical History:   Diagnosis Date    Carcinoma in situ of right breast 08/2014    Ductal in situ; Stage 0    Depressive disorder, not elsewhere classified     First degree atrioventricular block     Headaches     Hypothyroidism     Irregular heart beat     Other disorder of muscle, ligament, and fascia     Persistent disorder of initiating or maintaining sleep     Vitamin D deficiency      Past Surgical History:   Procedure Laterality Date    COLONOSCOPY N/A 9/23/2015    Procedure: COLONOSCOPY;  Surgeon: Shy Franco MD;  Location:  [facility]    COLONOSCOPY N/A 10/28/2020    Procedure: COLONOSCOPY;  Surgeon: Shy Franco MD;  Location:  [facility]    DISSECT LYMPH NODE AXILLA Bilateral 8/25/2014    Procedure: DISSECT LYMPH NODE AXILLA;  Surgeon: Renetta Delatorre MD;  Location: [facility]    EXCISE CYST THYROGLOSSAL DUCT  1988    FLUORESCENCE INTRAOPERATIVE VASCULAR ANGIOGRAPHY Bilateral 8/25/2014    Procedure: FLUORESCENCE INTRAOPERATIVE VASCULAR ANGIOGRAPHY;  Surgeon: Jyoti Richardson MD;  Location: [facility]    GRAFT FAT TO BREAST N/A 1/6/2015    Procedure: GRAFT FAT TO BREAST;  Surgeon: Jyoti Richardson MD;  Location: [facility]    INSERT TISSUE EXPANDER BREAST BILATERAL Bilateral 8/25/2014    Procedure: INSERT TISSUE EXPANDER BREAST BILATERAL;  Surgeon: Jyoti Richardson  MD Mari;  Location: [facility]    MASTECTOMY, NIPPLE SPARING Bilateral 8/25/2014    Procedure: MASTECTOMY, NIPPLE SPARING;  Surgeon: Renetta Delatorre MD;  Location: [facility]    RECONSTRUCT BREAST BILATERAL, IMPLANT PROSTHESIS BILATERAL, COMBINED Bilateral 1/6/2015    Procedure: COMBINED RECONSTRUCT BREAST BILATERAL, IMPLANT PROSTHESIS BILATERAL;  Surgeon: Jyoti Richardson MD;  Location: [facility]     Current Outpatient Medications   Medication Sig Dispense Refill    buPROPion (WELLBUTRIN XL) 150 MG 24 hr tablet Take 150 mg by mouth every morning      levothyroxine (SYNTHROID, LEVOTHROID) 75 MCG tablet Take 1 tablet by mouth daily.      losartan (COZAAR) 50 MG tablet Take 25 mg by mouth daily      Naratriptan HCl (AMERGE PO) Take 1 mg by mouth every 4 hours as needed for migraine      Cholecalciferol (VITAMIN D PO) Take 1 tablet by mouth daily.       OTC products: nightly benedryl   Allergies   Allergen Reactions    No Known Drug Allergy     Seafood Hives     Scallops or Crab meat allergy. Patient was not sure whether scallops or crab meat. Per patient last May 2023 after eating those she had a little hives on her face arms, but it resolved after an hour.      Social History     Tobacco Use    Smoking status: Never    Smokeless tobacco: Never   Substance Use Topics    Alcohol use: Yes     Alcohol/week: 9.0 standard drinks of alcohol     Types: 9 Standard drinks or equivalent per week     History   Drug Use No       REVIEW OF SYSTEMS:                                                    CONSTITUTIONAL: NEGATIVE for fever, chills, change in weight  INTEGUMENTARY/SKIN: NEGATIVE for worrisome rashes, moles or lesions  EYES: NEGATIVE for vision changes or irritation  ENT/MOUTH: NEGATIVE for ear, mouth and throat problems  RESP: NEGATIVE for significant cough or SOB  CV: NEGATIVE for chest pain, palpitations or peripheral edema  GI: NEGATIVE for nausea, abdominal pain, heartburn, or change in bowel habits  : NEGATIVE for  frequency, dysuria, or hematuria  MUSCULOSKELETAL: NEGATIVE for significant arthralgias or myalgia  NEURO: NEGATIVE for weakness, dizziness or paresthesias  ENDOCRINE: NEGATIVE for temperature intolerance, skin/hair changes  HEME: NEGATIVE for bleeding problems  PSYCHIATRIC: NEGATIVE for changes in mood or affect    EXAM:                                                    BP (!) 160/79   Pulse 85   SpO2 97%     GENERAL APPEARANCE: healthy, alert and no distress     EYES: EOMI, PERRL     HENT: ear canals and TM's normal and nose and mouth without ulcers or lesions     NECK: no adenopathy, no asymmetry, masses, or scars and thyroid normal to palpation     RESP: lungs clear to auscultation - no rales, rhonchi or wheezes     CV: regular rates and rhythm, normal S1 S2, no S3 or S4 and no murmur, click or rub     ABDOMEN:  soft, nontender, no HSM or masses and bowel sounds normal     MS: extremities normal- no gross deformities noted, no evidence of inflammation in joints, FROM in all extremities.     SKIN: no suspicious lesions or rashes     NEURO: Normal strength and tone, sensory exam grossly normal, mentation intact and speech normal     PSYCH: mentation appears normal. and affect normal/bright     LYMPHATICS: No cervical adenopathy    DIAGNOSTICS:                                                    EKG:     Sinus rhythm with 1st degree A-V block  Biatrial enlargement  Borderline ECG  When compared with ECG of 26-AUG-2022 11:37,  No significant change was found     Chest Xray:   Labs Resulted Today:   Results for orders placed or performed in visit on 11/06/23   XR Chest 2 Views     Status: None    Narrative    Chest 2 views    INDICATION: Potential organ donor    COMPARISON: 8/26/2022    FINDINGS: Heart size and shape appear normal. Lungs, pulmonary  vasculature and bony structures appear normal.      Impression    IMPRESSION: Negative    PHUONG FRIED MD         SYSTEM ID:  [id number]   Results for orders  placed or performed in visit on 11/06/23   Creatinine     Status: Normal   Result Value Ref Range    Creatinine 0.74 0.51 - 0.95 mg/dL    GFR Estimate >90 >60 mL/min/1.73m2   Hemoglobin     Status: Normal   Result Value Ref Range    Hemoglobin 14.4 11.7 - 15.7 g/dL   UA with Microscopic reflex to Culture     Status: Abnormal    Specimen: Urine, Midstream   Result Value Ref Range    Color Urine Straw Colorless, Straw, Light Yellow, Yellow    Appearance Urine Clear Clear    Glucose Urine Negative Negative mg/dL    Bilirubin Urine Negative Negative    Ketones Urine Negative Negative mg/dL    Specific Gravity Urine 1.002 (L) 1.003 - 1.035    Blood Urine Negative Negative    pH Urine 6.5 5.0 - 7.0    Protein Albumin Urine Negative Negative mg/dL    Urobilinogen Urine Normal Normal, 2.0 mg/dL    Nitrite Urine Negative Negative    Leukocyte Esterase Urine Negative Negative    RBC Urine 1 <=2 /HPF    WBC Urine 1 <=5 /HPF    Squamous Epithelials Urine 1 <=1 /HPF    Narrative    Urine Culture not indicated   HCG quantitative pregnancy     Status: Normal   Result Value Ref Range    hCG Quantitative <1 <5 mIU/mL   Adult Type and Screen     Status: None (Preliminary result)   Result Value Ref Range    SPECIMEN EXPIRATION DATE [id number]    ABO/Rh type and screen     Status: None (In process)    Narrative    The following orders were created for panel order ABO/Rh type and screen.  Procedure                               Abnormality         Status                     ---------                               -----------         ------                     Adult Type and Screen[[id number]]                            Preliminary result           Please view results for these tests on the individual orders.     Recent Labs   Lab Test 11/06/23  1334 10/09/23  1505 09/25/23  0837 08/26/22  0657   HGB 14.4  --  13.6 14.0   PLT  --   --  265 295   INR  --  0.99 1.22* 1.01   NA  --   --  139 136   POTASSIUM  --   --  3.6 3.5   CR 0.74   --  0.87 0.86   A1C  --   --  5.3 5.2      Assessment:                                                    Healthy voluntary kidney donor    There have been no significant intercurrent medical problems or change of intent in proceeding with kidney donation as scheduled on 11/15/23.  Healthy donor.  Left kidney with normal anatomy. One artery, one vein, one ureter.     Plan:                                                         Proceed with laparoscopic left donor nephrectomy.     Corey Good MD  Tx Fellow    I have reviewed history, examined patient and discussed plan with the fellow/resident/NATALIA.  I concur with the findings in this note.    Risks of the surgical procedure including but not limited to the rare risk of mortality discussed in detail. Patient verbalized good understanding and had several pertinent questions which were answered satisfactorily.     Total time: 45 min  Counseling time: 25 min

## 2023-11-06 NOTE — PROGRESS NOTES
Transplant Surgery H&P                                                        HPI:                                                      Ms. Harris is a 53 year old female who comes to clinic today for preop prior to planned laparoscopic kidney donation surgery. The patient was previously reviewed by the living donor multidisciplinary selection committee and found to be medically and psychosocially appropriate for voluntary kidney donation. The patient denies any feelings of being pressured to proceed with kidney donation.  Health events since donor evaluation: no    Special considerations:   Constipation: no  PONV: no  History of Urinary retention? no  Significant neck/back/joint concerns for lateral decubitus positioning?: No  Religious: No    Home Bps 130s/80s, well controlled on losartan. Follows with GP.    MEDICAL HISTORY:                                                      Patient Active Problem List    Diagnosis Date Noted    Tinnitus of left ear 05/22/2018     Priority: Medium    Seasonal affective disorder (H24) 12/06/2016     Priority: Medium    Acquired absence of breast 08/25/2014     Priority: Medium    FH: colon cancer 08/21/2014     Priority: Medium     Overview:   mom  71    colonoscopy   9/23/15   Dr Franco      negative   due  5 yrs       Breast cancer (H) 08/08/2014     Priority: Medium     Overview:   Dx  7/2014      considering options    see oncology       plans  bilat  mastectomy and  reconstruction      see notes     is on tamoxifen  per  Dr Naylor        Hypothyroidism 06/10/2014     Priority: Medium    Persistent disorder of initiating or maintaining sleep 06/05/2013     Priority: Medium    Transplant donor evaluation 03/08/2013     Priority: Medium    Depressive disorder 10/15/2012     Priority: Medium    Migraine without aura 10/15/2012     Priority: Medium    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     Priority: Medium    Headache 01/19/2011      Priority: Medium    Iliotibial band syndrome 04/27/2009     Priority: Medium    Palpitations 10/22/2008     Priority: Medium     Overview:    see   notes  Dr Browne,  10/08        watch                seem  benign  pVC's        First degree atrioventricular block 07/21/2008     Priority: Medium     Overview:   and incomplete   RBBB      on  ekg   7/08        Past Medical History:   Diagnosis Date    Carcinoma in situ of right breast 08/2014    Ductal in situ; Stage 0    Depressive disorder, not elsewhere classified     First degree atrioventricular block     Headaches     Hypothyroidism     Irregular heart beat     Other disorder of muscle, ligament, and fascia     Persistent disorder of initiating or maintaining sleep     Vitamin D deficiency      Past Surgical History:   Procedure Laterality Date    COLONOSCOPY N/A 9/23/2015    Procedure: COLONOSCOPY;  Surgeon: Shy Franco MD;  Location:  GI    COLONOSCOPY N/A 10/28/2020    Procedure: COLONOSCOPY;  Surgeon: Shy Franco MD;  Location: [facility]    DISSECT LYMPH NODE AXILLA Bilateral 8/25/2014    Procedure: DISSECT LYMPH NODE AXILLA;  Surgeon: Renetta Dealtorre MD;  Location: [facility]    EXCISE CYST THYROGLOSSAL DUCT  1988    FLUORESCENCE INTRAOPERATIVE VASCULAR ANGIOGRAPHY Bilateral 8/25/2014    Procedure: FLUORESCENCE INTRAOPERATIVE VASCULAR ANGIOGRAPHY;  Surgeon: Jyoti Richardson MD;  Location: [facility]    GRAFT FAT TO BREAST N/A 1/6/2015    Procedure: GRAFT FAT TO BREAST;  Surgeon: Jyoti Richardson MD;  Location: [facility]    INSERT TISSUE EXPANDER BREAST BILATERAL Bilateral 8/25/2014    Procedure: INSERT TISSUE EXPANDER BREAST BILATERAL;  Surgeon: Jyoti Richardson MD;  Location: [facility]    MASTECTOMY, NIPPLE SPARING Bilateral 8/25/2014    Procedure: MASTECTOMY, NIPPLE SPARING;  Surgeon: Renetta Delatorre MD;  Location: [facility]    RECONSTRUCT BREAST BILATERAL, IMPLANT PROSTHESIS BILATERAL, COMBINED Bilateral 1/6/2015    Procedure:  COMBINED RECONSTRUCT BREAST BILATERAL, IMPLANT PROSTHESIS BILATERAL;  Surgeon: Jyoti Richardson MD;  Location: [facility]     Current Outpatient Medications   Medication Sig Dispense Refill    buPROPion (WELLBUTRIN XL) 150 MG 24 hr tablet Take 150 mg by mouth every morning      levothyroxine (SYNTHROID, LEVOTHROID) 75 MCG tablet Take 1 tablet by mouth daily.      losartan (COZAAR) 50 MG tablet Take 25 mg by mouth daily      Naratriptan HCl (AMERGE PO) Take 1 mg by mouth every 4 hours as needed for migraine      Cholecalciferol (VITAMIN D PO) Take 1 tablet by mouth daily.       OTC products: nightly benedryl   Allergies   Allergen Reactions    No Known Drug Allergy     Seafood Hives     Scallops or Crab meat allergy. Patient was not sure whether scallops or crab meat. Per patient last May 2023 after eating those she had a little hives on her face arms, but it resolved after an hour.      Social History     Tobacco Use    Smoking status: Never    Smokeless tobacco: Never   Substance Use Topics    Alcohol use: Yes     Alcohol/week: 9.0 standard drinks of alcohol     Types: 9 Standard drinks or equivalent per week     History   Drug Use No       REVIEW OF SYSTEMS:                                                    CONSTITUTIONAL: NEGATIVE for fever, chills, change in weight  INTEGUMENTARY/SKIN: NEGATIVE for worrisome rashes, moles or lesions  EYES: NEGATIVE for vision changes or irritation  ENT/MOUTH: NEGATIVE for ear, mouth and throat problems  RESP: NEGATIVE for significant cough or SOB  CV: NEGATIVE for chest pain, palpitations or peripheral edema  GI: NEGATIVE for nausea, abdominal pain, heartburn, or change in bowel habits  : NEGATIVE for frequency, dysuria, or hematuria  MUSCULOSKELETAL: NEGATIVE for significant arthralgias or myalgia  NEURO: NEGATIVE for weakness, dizziness or paresthesias  ENDOCRINE: NEGATIVE for temperature intolerance, skin/hair changes  HEME: NEGATIVE for bleeding problems  PSYCHIATRIC:  NEGATIVE for changes in mood or affect    EXAM:                                                    BP (!) 160/79   Pulse 85   SpO2 97%     GENERAL APPEARANCE: healthy, alert and no distress     EYES: EOMI, PERRL     HENT: ear canals and TM's normal and nose and mouth without ulcers or lesions     NECK: no adenopathy, no asymmetry, masses, or scars and thyroid normal to palpation     RESP: lungs clear to auscultation - no rales, rhonchi or wheezes     CV: regular rates and rhythm, normal S1 S2, no S3 or S4 and no murmur, click or rub     ABDOMEN:  soft, nontender, no HSM or masses and bowel sounds normal     MS: extremities normal- no gross deformities noted, no evidence of inflammation in joints, FROM in all extremities.     SKIN: no suspicious lesions or rashes     NEURO: Normal strength and tone, sensory exam grossly normal, mentation intact and speech normal     PSYCH: mentation appears normal. and affect normal/bright     LYMPHATICS: No cervical adenopathy    DIAGNOSTICS:                                                    EKG:     Sinus rhythm with 1st degree A-V block  Biatrial enlargement  Borderline ECG  When compared with ECG of 26-AUG-2022 11:37,  No significant change was found     Chest Xray:   Labs Resulted Today:   Results for orders placed or performed in visit on 11/06/23   XR Chest 2 Views     Status: None    Narrative    Chest 2 views    INDICATION: Potential organ donor    COMPARISON: 8/26/2022    FINDINGS: Heart size and shape appear normal. Lungs, pulmonary  vasculature and bony structures appear normal.      Impression    IMPRESSION: Negative    PHUONG FRIED MD         SYSTEM ID:  [id number]   Results for orders placed or performed in visit on 11/06/23   Creatinine     Status: Normal   Result Value Ref Range    Creatinine 0.74 0.51 - 0.95 mg/dL    GFR Estimate >90 >60 mL/min/1.73m2   Hemoglobin     Status: Normal   Result Value Ref Range    Hemoglobin 14.4 11.7 - 15.7 g/dL   UA with  Microscopic reflex to Culture     Status: Abnormal    Specimen: Urine, Midstream   Result Value Ref Range    Color Urine Straw Colorless, Straw, Light Yellow, Yellow    Appearance Urine Clear Clear    Glucose Urine Negative Negative mg/dL    Bilirubin Urine Negative Negative    Ketones Urine Negative Negative mg/dL    Specific Gravity Urine 1.002 (L) 1.003 - 1.035    Blood Urine Negative Negative    pH Urine 6.5 5.0 - 7.0    Protein Albumin Urine Negative Negative mg/dL    Urobilinogen Urine Normal Normal, 2.0 mg/dL    Nitrite Urine Negative Negative    Leukocyte Esterase Urine Negative Negative    RBC Urine 1 <=2 /HPF    WBC Urine 1 <=5 /HPF    Squamous Epithelials Urine 1 <=1 /HPF    Narrative    Urine Culture not indicated   HCG quantitative pregnancy     Status: Normal   Result Value Ref Range    hCG Quantitative <1 <5 mIU/mL   Adult Type and Screen     Status: None (Preliminary result)   Result Value Ref Range    SPECIMEN EXPIRATION DATE [id number]    ABO/Rh type and screen     Status: None (In process)    Narrative    The following orders were created for panel order ABO/Rh type and screen.  Procedure                               Abnormality         Status                     ---------                               -----------         ------                     Adult Type and Screen[[id number]]                            Preliminary result           Please view results for these tests on the individual orders.     Recent Labs   Lab Test 11/06/23  1334 10/09/23  1505 09/25/23  0837 08/26/22  0657   HGB 14.4  --  13.6 14.0   PLT  --   --  265 295   INR  --  0.99 1.22* 1.01   NA  --   --  139 136   POTASSIUM  --   --  3.6 3.5   CR 0.74  --  0.87 0.86   A1C  --   --  5.3 5.2      Assessment:                                                    Healthy voluntary kidney donor    There have been no significant intercurrent medical problems or change of intent in proceeding with kidney donation as scheduled on  11/15/23.  Healthy donor.  Left kidney with normal anatomy. One artery, one vein, one ureter.     Plan:                                                         Proceed with laparoscopic left donor nephrectomy.     Corey Good MD  Tx Fellow    I have reviewed history, examined patient and discussed plan with the fellow/resident/NATLAIA.  I concur with the findings in this note.    Risks of the surgical procedure including but not limited to the rare risk of mortality discussed in detail. Patient verbalized good understanding and had several pertinent questions which were answered satisfactorily.     Total time: 45 min  Counseling time: 25 min

## 2023-11-06 NOTE — LETTER
11/6/2023         RE: Lori Harris  [address] [facility] [place] [address]        Dear Colleague,    Thank you for referring your patient, Lori Harris, to the [facility]. Please see a copy of my visit note below.    Transplant Surgery H&P                                                        HPI:                                                      Ms. Harris is a 53 year old female who comes to clinic today for preop prior to planned laparoscopic kidney donation surgery. The patient was previously reviewed by the living donor multidisciplinary selection committee and found to be medically and psychosocially appropriate for voluntary kidney donation. The patient denies any feelings of being pressured to proceed with kidney donation.  Health events since donor evaluation: Losartan decreased due to lightheadedness, home BP readings now 130s/80. Treated for H.pylori infection. Iron deficiency anemia, was on iron supplements. Hgb normalized.     Special considerations:   Constipation: no  PONV: no  History of Urinary retention? no  Significant neck/back/joint concerns for lateral decubitus positioning?: No  Jewish: No      MEDICAL HISTORY:                                                      Patient Active Problem List    Diagnosis Date Noted    Tinnitus of left ear 05/22/2018     Priority: Medium    Seasonal affective disorder (H24) 12/06/2016     Priority: Medium    Acquired absence of breast 08/25/2014     Priority: Medium    FH: colon cancer 08/21/2014     Priority: Medium     Overview:   mom  72    colonoscopy   9/23/15   Dr Franco      negative   due  5 yrs       Breast cancer (H) 08/08/2014     Priority: Medium     Overview:   Dx  7/2014      considering options    see oncology       plans  bilat  mastectomy and  reconstruction      see notes     is on tamoxifen  per  Dr Naylor        Hypothyroidism 06/10/2014     Priority: Medium     Persistent disorder of initiating or maintaining sleep 06/05/2013     Priority: Medium    Transplant donor evaluation 03/08/2013     Priority: Medium    Depressive disorder 10/15/2012     Priority: Medium    Migraine without aura 10/15/2012     Priority: Medium    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     Priority: Medium    Headache 01/19/2011     Priority: Medium    Iliotibial band syndrome 04/27/2009     Priority: Medium    Palpitations 10/22/2008     Priority: Medium     Overview:    see   notes  Dr Browne,  10/08        watch                seem  benign  pVC's        First degree atrioventricular block 07/21/2008     Priority: Medium     Overview:   and incomplete   RBBB      on  ekg   7/08        Past Medical History:   Diagnosis Date    Carcinoma in situ of right breast 08/2014    Ductal in situ; Stage 0    Depressive disorder, not elsewhere classified     First degree atrioventricular block     Headaches     Hypothyroidism     Irregular heart beat     Other disorder of muscle, ligament, and fascia     Persistent disorder of initiating or maintaining sleep     Vitamin D deficiency      Past Surgical History:   Procedure Laterality Date    COLONOSCOPY N/A 9/23/2015    Procedure: COLONOSCOPY;  Surgeon: Shy Franco MD;  Location:  GI    COLONOSCOPY N/A 10/28/2020    Procedure: COLONOSCOPY;  Surgeon: Shy Franco MD;  Location: [facility]    DISSECT LYMPH NODE AXILLA Bilateral 8/25/2014    Procedure: DISSECT LYMPH NODE AXILLA;  Surgeon: Renetta Delatorre MD;  Location: [facility]    EXCISE CYST THYROGLOSSAL DUCT  1988    FLUORESCENCE INTRAOPERATIVE VASCULAR ANGIOGRAPHY Bilateral 8/25/2014    Procedure: FLUORESCENCE INTRAOPERATIVE VASCULAR ANGIOGRAPHY;  Surgeon: Jyoti Richardson MD;  Location: [facility]    GRAFT FAT TO BREAST N/A 1/6/2015    Procedure: GRAFT FAT TO BREAST;  Surgeon: Jyoti Richardson MD;  Location: [facility]    INSERT TISSUE EXPANDER BREAST  BILATERAL Bilateral 8/25/2014    Procedure: INSERT TISSUE EXPANDER BREAST BILATERAL;  Surgeon: Jyoti Richardson MD;  Location: [facility]    MASTECTOMY, NIPPLE SPARING Bilateral 8/25/2014    Procedure: MASTECTOMY, NIPPLE SPARING;  Surgeon: Renetta Delatorre MD;  Location: [facility]    RECONSTRUCT BREAST BILATERAL, IMPLANT PROSTHESIS BILATERAL, COMBINED Bilateral 1/6/2015    Procedure: COMBINED RECONSTRUCT BREAST BILATERAL, IMPLANT PROSTHESIS BILATERAL;  Surgeon: Jyoti Richardson MD;  Location: [facility]     Current Outpatient Medications   Medication Sig Dispense Refill    buPROPion (WELLBUTRIN XL) 150 MG 24 hr tablet Take 150 mg by mouth every morning      levothyroxine (SYNTHROID, LEVOTHROID) 75 MCG tablet Take 1 tablet by mouth daily.      losartan (COZAAR) 50 MG tablet Take 25 mg by mouth daily      Naratriptan HCl (AMERGE PO) Take 1 mg by mouth every 4 hours as needed for migraine       OTC products: nightly benedryl   Allergies   Allergen Reactions    No Known Drug Allergy     Seafood Hives     Scallops or Crab meat allergy. Patient was not sure whether scallops or crab meat. Per patient last May 2023 after eating those she had a little hives on her face arms, but it resolved after an hour.      Social History     Tobacco Use    Smoking status: Never    Smokeless tobacco: Never   Substance Use Topics    Alcohol use: Yes     Alcohol/week: 9.0 standard drinks of alcohol     Types: 9 Standard drinks or equivalent per week     History   Drug Use No       REVIEW OF SYSTEMS:                                                    CONSTITUTIONAL: NEGATIVE for fever, chills, change in weight  INTEGUMENTARY/SKIN: NEGATIVE for worrisome rashes, moles or lesions  EYES: NEGATIVE for vision changes or irritation  ENT/MOUTH: NEGATIVE for ear, mouth and throat problems  RESP: NEGATIVE for significant cough or SOB  CV: NEGATIVE for chest pain, palpitations or peripheral edema  GI: NEGATIVE for nausea, abdominal pain, heartburn,  or change in bowel habits  : NEGATIVE for frequency, dysuria, or hematuria  MUSCULOSKELETAL: NEGATIVE for significant arthralgias or myalgia  NEURO: NEGATIVE for weakness, dizziness or paresthesias  ENDOCRINE: NEGATIVE for temperature intolerance, skin/hair changes  HEME: NEGATIVE for bleeding problems  PSYCHIATRIC: NEGATIVE for changes in mood or affect    EXAM:                                                    There were no vitals taken for this visit.    GENERAL APPEARANCE: healthy, alert and no distress     EYES: EOMI, PERRL     HENT: ear canals and TM's normal and nose and mouth without ulcers or lesions     NECK: no adenopathy, no asymmetry, masses, or scars and thyroid normal to palpation     RESP: lungs clear to auscultation - no rales, rhonchi or wheezes     CV: regular rates and rhythm, normal S1 S2, no S3 or S4 and no murmur, click or rub     ABDOMEN:  soft, nontender, no HSM or masses and bowel sounds normal     MS: extremities normal- no gross deformities noted, no evidence of inflammation in joints, FROM in all extremities.     SKIN: no suspicious lesions or rashes     NEURO: Normal strength and tone, sensory exam grossly normal, mentation intact and speech normal     PSYCH: mentation appears normal. and affect normal/bright     LYMPHATICS: No cervical adenopathy    DIAGNOSTICS:                                                    EKG:     Sinus rhythm with 1st degree A-V block  Biatrial enlargement  Borderline ECG  When compared with ECG of 26-AUG-2022 11:37,  No significant change was found     Chest Xray: negative for acute changes   Labs Resulted Today:   Results for orders placed or performed in visit on 11/06/23   XR Chest 2 Views     Status: None    Narrative    Chest 2 views    INDICATION: Potential organ donor    COMPARISON: 8/26/2022    FINDINGS: Heart size and shape appear normal. Lungs, pulmonary  vasculature and bony structures appear normal.      Impression    IMPRESSION:  Negative    PHUONG FRIED MD         SYSTEM ID:  [id number]   Results for orders placed or performed in visit on 11/06/23   Creatinine     Status: Normal   Result Value Ref Range    Creatinine 0.74 0.51 - 0.95 mg/dL    GFR Estimate >90 >60 mL/min/1.73m2   Hemoglobin     Status: Normal   Result Value Ref Range    Hemoglobin 14.4 11.7 - 15.7 g/dL   UA with Microscopic reflex to Culture     Status: Abnormal    Specimen: Urine, Midstream   Result Value Ref Range    Color Urine Straw Colorless, Straw, Light Yellow, Yellow    Appearance Urine Clear Clear    Glucose Urine Negative Negative mg/dL    Bilirubin Urine Negative Negative    Ketones Urine Negative Negative mg/dL    Specific Gravity Urine 1.002 (L) 1.003 - 1.035    Blood Urine Negative Negative    pH Urine 6.5 5.0 - 7.0    Protein Albumin Urine Negative Negative mg/dL    Urobilinogen Urine Normal Normal, 2.0 mg/dL    Nitrite Urine Negative Negative    Leukocyte Esterase Urine Negative Negative    RBC Urine 1 <=2 /HPF    WBC Urine 1 <=5 /HPF    Squamous Epithelials Urine 1 <=1 /HPF    Narrative    Urine Culture not indicated   HCG quantitative pregnancy     Status: Normal   Result Value Ref Range    hCG Quantitative <1 <5 mIU/mL   Adult Type and Screen     Status: None   Result Value Ref Range    ABO/RH(D) O POS     Antibody Screen Negative Negative    SPECIMEN EXPIRATION DATE [id number]    ABO/Rh type and screen     Status: None    Narrative    The following orders were created for panel order ABO/Rh type and screen.  Procedure                               Abnormality         Status                     ---------                               -----------         ------                     Adult Type and Screen[[id number]]                            Final result                 Please view results for these tests on the individual orders.     Recent Labs   Lab Test 11/06/23  1334 10/09/23  1505 09/25/23  0837 08/26/22  0657   HGB 14.4  --  13.6 14.0   PLT   --   --  265 295   INR  --  0.99 1.22* 1.01   NA  --   --  139 136   POTASSIUM  --   --  3.6 3.5   CR 0.74  --  0.87 0.86   A1C  --   --  5.3 5.2      Assessment:                                                    Healthy voluntary kidney donor    There have been no significant intercurrent medical problems or change of intent in proceeding with kidney donation as scheduled on 11/15/23.    1. Labs reviewed and within normal limits: Yes  2. EKG (9/25/23):     Sinus rhythm with 1st degree A-V block  Biatrial enlargement  Borderline ECG  When compared with ECG of 26-AUG-2022 11:37,  No significant change was found   ECHO 8/26/22: Global and regional left ventricular function is normal with an EF of 60-65%.  Global right ventricular function is normal.  No valvular abnormalities present.    3. Paired Exchange case: No  4. ABO= O POS  5. Laterality: left  6. Outstanding issues: None. Can take meds the morning of surgery 2 hrs prior to sx time, decrease losartan dose to half for that morning only.     Plan:                                                      1. Consent: Done  2. Outstanding issues: None    Signed Electronically by: HUMBERTO Baum CNP         Again, thank you for allowing me to participate in the care of your patient.        Sincerely,        HUMBERTO Baum CNP

## 2023-11-07 NOTE — PROGRESS NOTES
Saw Lori Harris in clinic for her Pre-Op visit.  I reviewed procedure for the surgery day.  She will be donating as a Non direct donor in the National Kidney Registry. Patient completed PHS questionnaire and answered no to all questions.  I reviewed SRTR datasheet for other center and gave the patient a copy.     I reviewed pain control medication that is typically prescribed including nerve block which Anesthesia will place preop.  I reviewed the ERAS protocol teaching sheet:  Encourage Activity.  Demonstrated and reviewed importance of Incentive Spirometry usage.   I gave her two bottles of Ensure clear with instructions to drink at bedtime on 11/14 and when awakens on 11/15.  I reviewed NPO status begins at 04:00 on day of surgery.  I reviewed arrival time of 04:00 to  a PACU. Start time of surgery 06:00.    Pt will meet with Surgeon and Fellow today to develop plan for surgery.  I gave her a donor blanket, thank you card, and parking vouchers.   I thanked her for all that she is doing to make this happen.  I reviewed that her bills will be paid for by insurance of the matched recipient.    I reviewed that she has a right to withdraw from kidney donation at any time, for any reason.      I reviewed post op plan of care including timing for office visit with surgeon and when labs are needed.  She will go home after discharge.     I reviewed the KPD program's rules for communication between she and her matched recipient.  I stated she can mail a card to me and I will see that it gets to her recipient, I ask that she keeps her identifiers off of the mailing.  She needs to think about whether she would like to correspond with the anonymous recipient.     Preop Preparations for KPD Event:  I assessed both the donor and recipient ABO blood types, UNOS ID and reviewed documents with Dr Joyner.      Final XM blood kit shipped today.  Serology kit drawn and shipped today to [facility]  I have been given clearance to  proceed from Financial and Transplant Managers.  All centers in [facility] meet accreditation of CMS.  Patient to confirm if all family members she listed on her voucher form are willing to participate in Voucher program.  I reviewed that the documents need to be received and uploaded by her donation date.  Patient stated understanding.    Patient would like to file donor shield.  I have initiated the invitation for Donor Shield.  Questions answered.    Soha Jackson RN  Living Donor Coordinator  11/06/2023 6:37 PM

## 2023-11-09 ENCOUNTER — TELEPHONE (OUTPATIENT)
Dept: TRANSPLANT | Facility: CLINIC | Age: 53
End: 2023-11-09
Payer: COMMERCIAL

## 2023-11-09 NOTE — TELEPHONE ENCOUNTER
Reviewed swap failure situation.  Lori would like to keep the date of surgery if possible on Nov 15 in order to allow her to heal more before her trip to [place].  I have communicated this to NKR and will monitor her swap further.  I told Lori she will likely need to repeat the Final XM.    Answered patient's questions.  Soha Jackson RN  Living Donor Coordinator  11/09/2023 4:16 PM

## 2023-11-10 ENCOUNTER — DOCUMENTATION ONLY (OUTPATIENT)
Dept: TRANSPLANT | Facility: CLINIC | Age: 53
End: 2023-11-10
Payer: COMMERCIAL

## 2023-11-10 NOTE — TELEPHONE ENCOUNTER
I called the patient to update her that dick is moving forward on her original date of 11/15 and that she does not need to do a blood draw.  She will tell her two family members that still need to submit their family voucher forms that she needs them by her surgery date.  I answered the patient's questions.  I encouraged her to reach out to me if any questions or concerns.  Soha Jackson RN  Living Donor Coordinator  11/10/2023 3:51 PM

## 2023-11-14 ENCOUNTER — ANESTHESIA EVENT (OUTPATIENT)
Dept: SURGERY | Facility: CLINIC | Age: 53
DRG: 661 | End: 2023-11-14
Payer: COMMERCIAL

## 2023-11-14 NOTE — ANESTHESIA PREPROCEDURE EVALUATION
Anesthesia Pre-Procedure Evaluation    Patient: Lori Harris   MRN: [id number] : 1970        Procedure : Procedure(s):  Laparoscopic Hand Assisted Living Non-Directed Kidney Donor Kidney Paired Donor Kidney to be Shipped          Past Medical History:   Diagnosis Date    Carcinoma in situ of right breast 2014    Ductal in situ; Stage 0    Depressive disorder, not elsewhere classified     First degree atrioventricular block     Headaches     Hypothyroidism     Irregular heart beat     Other disorder of muscle, ligament, and fascia     Persistent disorder of initiating or maintaining sleep     Vitamin D deficiency       Past Surgical History:   Procedure Laterality Date    COLONOSCOPY N/A 2015    Procedure: COLONOSCOPY;  Surgeon: Shy Franco MD;  Location:  GI    COLONOSCOPY N/A 10/28/2020    Procedure: COLONOSCOPY;  Surgeon: Shy Franco MD;  Location:  GI    DISSECT LYMPH NODE AXILLA Bilateral 2014    Procedure: DISSECT LYMPH NODE AXILLA;  Surgeon: Renetta Delatorre MD;  Location: [facility]    EXCISE CYST THYROGLOSSAL DUCT  1988    FLUORESCENCE INTRAOPERATIVE VASCULAR ANGIOGRAPHY Bilateral 2014    Procedure: FLUORESCENCE INTRAOPERATIVE VASCULAR ANGIOGRAPHY;  Surgeon: Jyoti Richardson MD;  Location: [facility]    GRAFT FAT TO BREAST N/A 2015    Procedure: GRAFT FAT TO BREAST;  Surgeon: Jyoti Richardson MD;  Location: [facility]    INSERT TISSUE EXPANDER BREAST BILATERAL Bilateral 2014    Procedure: INSERT TISSUE EXPANDER BREAST BILATERAL;  Surgeon: Jyoti Richardson MD;  Location: [facility]    MASTECTOMY, NIPPLE SPARING Bilateral 2014    Procedure: MASTECTOMY, NIPPLE SPARING;  Surgeon: Renetta Dealtorre MD;  Location: [facility]    RECONSTRUCT BREAST BILATERAL, IMPLANT PROSTHESIS BILATERAL, COMBINED Bilateral 2015    Procedure: COMBINED RECONSTRUCT BREAST BILATERAL, IMPLANT PROSTHESIS BILATERAL;  Surgeon: Jyoti Richardson MD;   Location: [facility]      Allergies   Allergen Reactions    No Known Drug Allergy     Seafood Hives     Scallops or Crab meat allergy. Patient was not sure whether scallops or crab meat. Per patient last May 2023 after eating those she had a little hives on her face arms, but it resolved after an hour.      Social History     Tobacco Use    Smoking status: Never    Smokeless tobacco: Never   Substance Use Topics    Alcohol use: Yes     Alcohol/week: 9.0 standard drinks of alcohol     Types: 9 Standard drinks or equivalent per week     Comment: advised to hold 24hrs prior to DOS      Wt Readings from Last 1 Encounters:   09/25/23 65.6 kg (144 lb 11.2 oz)        Anesthesia Evaluation   Pt has had prior anesthetic. Type: General.    No history of anesthetic complications       ROS/MED HX  ENT/Pulmonary:  - neg pulmonary ROS     Neurologic:  - neg neurologic ROS     Cardiovascular:     (+)  hypertension- -   -  - -                                 Previous cardiac testing   Echo: Date: 8/2022 Results:  Interpretation Summary  Global and regional left ventricular function is normal with an EF of 60-65%.  Global right ventricular function is normal.  No valvular abnormalities present.  Previous study not available for comparison.    Stress Test:  Date: Results:    ECG Reviewed:  Date: 9/25/23 Results:  Sinus rhythm 1st degree AV block    Cath:  Date: Results:      METS/Exercise Tolerance: >4 METS    Hematologic:  - neg hematologic  ROS     Musculoskeletal:  - neg musculoskeletal ROS     GI/Hepatic:  - neg GI/hepatic ROS     Renal/Genitourinary:  - neg Renal ROS     Endo:     (+)          thyroid problem, hypothyroidism,           Psychiatric/Substance Use:     (+) psychiatric history depression    (-) alcohol abuse history   Infectious Disease:  - neg infectious disease ROS     Malignancy:   (+) Malignancy, History of Breast.Breast CA Remission status post Surgery.      Other:  - neg other ROS          Physical  Exam    Airway        Mallampati: I   TM distance: > 3 FB   Neck ROM: full   Mouth opening: > 3 cm    Respiratory Devices and Support         Dental       (+) Minor Abnormalities - some fillings, tiny chips      Cardiovascular   cardiovascular exam normal          Pulmonary   pulmonary exam normal                OUTSIDE LABS:  CBC:   Lab Results   Component Value Date    WBC 5.5 10/09/2023    WBC 3.9 (L) 09/25/2023    HGB 14.4 11/06/2023    HGB 13.6 09/25/2023    HCT 41.2 09/25/2023    HCT 41.7 08/26/2022     09/25/2023     08/26/2022     BMP:   Lab Results   Component Value Date     09/25/2023     08/26/2022    POTASSIUM 3.6 09/25/2023    POTASSIUM 3.5 08/26/2022    CHLORIDE 105 09/25/2023    CHLORIDE 106 08/26/2022    CO2 25 09/25/2023    CO2 28 08/26/2022    BUN 10.8 09/25/2023    BUN 10 08/26/2022    CR 0.74 11/06/2023    CR 0.87 09/25/2023    GLC 85 09/25/2023    GLC 91 08/26/2022     COAGS:   Lab Results   Component Value Date    PTT 30 09/25/2023    INR 0.99 10/09/2023     POC:   Lab Results   Component Value Date    BGM 83 08/26/2014    HCG Negative 01/06/2015    HCGS Negative 04/19/2013     HEPATIC:   Lab Results   Component Value Date    ALBUMIN 4.4 09/25/2023    PROTTOTAL 6.7 09/25/2023    ALT 11 09/25/2023    AST 20 09/25/2023    ALKPHOS 55 09/25/2023    BILITOTAL 0.5 09/25/2023     OTHER:   Lab Results   Component Value Date    A1C 5.3 09/25/2023    LYNN 9.1 09/25/2023    PHOS 3.3 09/25/2023    TSH 1.62 05/23/2023       Anesthesia Plan    ASA Status:  2    NPO Status:  NPO Appropriate    Anesthesia Type: General.     - Airway: ETT   Induction: Intravenous, Propofol.   Maintenance: Balanced.   Techniques and Equipment:     - Lines/Monitors: 2nd IV, BIS     - Blood: T&S     Consents    Anesthesia Plan(s) and associated risks, benefits, and realistic alternatives discussed. Questions answered and patient/representative(s) expressed understanding.     - Discussed: Risks, Benefits  and Alternatives for BOTH SEDATION and the PROCEDURE were discussed     - Discussed with:  Patient      - Extended Intubation/Ventilatory Support Discussed: No.      - Patient is DNR/DNI Status: No     Use of blood products discussed: No .     Postoperative Care    Pain management: IV analgesics, Oral pain medications, Multi-modal analgesia, Peripheral nerve block (Single Shot).   PONV prophylaxis: Ondansetron (or other 5HT-3), Dexamethasone or Solumedrol, Background Propofol Infusion     Comments:                Lamberto Irving MD

## 2023-11-15 ENCOUNTER — ANESTHESIA (OUTPATIENT)
Dept: SURGERY | Facility: CLINIC | Age: 53
DRG: 661 | End: 2023-11-15
Payer: COMMERCIAL

## 2023-11-15 ENCOUNTER — HOSPITAL ENCOUNTER (INPATIENT)
Facility: CLINIC | Age: 53
LOS: 1 days | Discharge: HOME | DRG: 661 | End: 2023-11-16
Attending: SURGERY | Admitting: SURGERY
Payer: COMMERCIAL

## 2023-11-15 DIAGNOSIS — Z52.4 ENCOUNTER FOR DONATION OF KIDNEY: ICD-10-CM

## 2023-11-15 DIAGNOSIS — R51.9 NONINTRACTABLE HEADACHE, UNSPECIFIED CHRONICITY PATTERN, UNSPECIFIED HEADACHE TYPE: ICD-10-CM

## 2023-11-15 DIAGNOSIS — Z00.5 TRANSPLANT DONOR EVALUATION: Primary | ICD-10-CM

## 2023-11-15 DIAGNOSIS — G89.18 ACUTE POST-OPERATIVE PAIN: ICD-10-CM

## 2023-11-15 LAB
ABO/RH(D): NORMAL
ANTIBODY SCREEN: NEGATIVE
CK SERPL-CCNC: 79 U/L (ref 26–192)
ERYTHROCYTE [DISTWIDTH] IN BLOOD BY AUTOMATED COUNT: 16.5 % (ref 10–15)
GLUCOSE BLDC GLUCOMTR-MCNC: 115 MG/DL (ref 70–99)
GLUCOSE BLDC GLUCOMTR-MCNC: 179 MG/DL (ref 70–99)
HCT VFR BLD AUTO: 36.7 % (ref 35–47)
HGB BLD-MCNC: 12.7 G/DL (ref 11.7–15.7)
HOLD SPECIMEN: NORMAL
HOLD SPECIMEN: NORMAL
MCH RBC QN AUTO: 30.4 PG (ref 26.5–33)
MCHC RBC AUTO-ENTMCNC: 34.6 G/DL (ref 31.5–36.5)
MCV RBC AUTO: 88 FL (ref 78–100)
PLATELET # BLD AUTO: 212 10E3/UL (ref 150–450)
RBC # BLD AUTO: 4.18 10E6/UL (ref 3.8–5.2)
SPECIMEN EXPIRATION DATE: NORMAL
WBC # BLD AUTO: 17.3 10E3/UL (ref 4–11)

## 2023-11-15 PROCEDURE — 360N000077 HC SURGERY LEVEL 4, PER MIN: Performed by: SURGERY

## 2023-11-15 PROCEDURE — 258N000003 HC RX IP 258 OP 636: Performed by: STUDENT IN AN ORGANIZED HEALTH CARE EDUCATION/TRAINING PROGRAM

## 2023-11-15 PROCEDURE — 36415 COLL VENOUS BLD VENIPUNCTURE: CPT | Performed by: NURSE PRACTITIONER

## 2023-11-15 PROCEDURE — 0TT10ZZ RESECTION OF LEFT KIDNEY, OPEN APPROACH: ICD-10-PCS | Performed by: SURGERY

## 2023-11-15 PROCEDURE — 85027 COMPLETE CBC AUTOMATED: CPT | Performed by: NURSE PRACTITIONER

## 2023-11-15 PROCEDURE — 250N000009 HC RX 250: Performed by: STUDENT IN AN ORGANIZED HEALTH CARE EDUCATION/TRAINING PROGRAM

## 2023-11-15 PROCEDURE — 272N000001 HC OR GENERAL SUPPLY STERILE: Performed by: SURGERY

## 2023-11-15 PROCEDURE — 86900 BLOOD TYPING SEROLOGIC ABO: CPT | Performed by: STUDENT IN AN ORGANIZED HEALTH CARE EDUCATION/TRAINING PROGRAM

## 2023-11-15 PROCEDURE — 370N000017 HC ANESTHESIA TECHNICAL FEE, PER MIN: Performed by: SURGERY

## 2023-11-15 PROCEDURE — 250N000009 HC RX 250: Performed by: SURGERY

## 2023-11-15 PROCEDURE — 82550 ASSAY OF CK (CPK): CPT | Performed by: NURSE PRACTITIONER

## 2023-11-15 PROCEDURE — 258N000003 HC RX IP 258 OP 636: Performed by: NURSE PRACTITIONER

## 2023-11-15 PROCEDURE — 250N000011 HC RX IP 250 OP 636: Performed by: STUDENT IN AN ORGANIZED HEALTH CARE EDUCATION/TRAINING PROGRAM

## 2023-11-15 PROCEDURE — 710N000009 HC RECOVERY PHASE 1, LEVEL 1, PER MIN: Performed by: SURGERY

## 2023-11-15 PROCEDURE — 250N000013 HC RX MED GY IP 250 OP 250 PS 637

## 2023-11-15 PROCEDURE — 88240 CELL CRYOPRESERVE/STORAGE: CPT | Performed by: NURSE PRACTITIONER

## 2023-11-15 PROCEDURE — 50547 LAPARO REMOVAL DONOR KIDNEY: CPT | Mod: LT | Performed by: SURGERY

## 2023-11-15 PROCEDURE — 120N000011 HC R&B TRANSPLANT UMMC

## 2023-11-15 PROCEDURE — 250N000011 HC RX IP 250 OP 636: Mod: JZ | Performed by: ANESTHESIOLOGY

## 2023-11-15 PROCEDURE — 250N000013 HC RX MED GY IP 250 OP 250 PS 637: Performed by: STUDENT IN AN ORGANIZED HEALTH CARE EDUCATION/TRAINING PROGRAM

## 2023-11-15 PROCEDURE — 250N000013 HC RX MED GY IP 250 OP 250 PS 637: Performed by: NURSE PRACTITIONER

## 2023-11-15 PROCEDURE — 999N000141 HC STATISTIC PRE-PROCEDURE NURSING ASSESSMENT: Performed by: SURGERY

## 2023-11-15 PROCEDURE — 250N000025 HC SEVOFLURANE, PER MIN: Performed by: SURGERY

## 2023-11-15 PROCEDURE — 250N000011 HC RX IP 250 OP 636: Performed by: NURSE PRACTITIONER

## 2023-11-15 PROCEDURE — 0TB70ZZ EXCISION OF LEFT URETER, OPEN APPROACH: ICD-10-PCS | Performed by: SURGERY

## 2023-11-15 PROCEDURE — C9290 INJ, BUPIVACAINE LIPOSOME: HCPCS | Performed by: ANESTHESIOLOGY

## 2023-11-15 RX ORDER — FENTANYL CITRATE 50 UG/ML
INJECTION, SOLUTION INTRAMUSCULAR; INTRAVENOUS PRN
Status: DISCONTINUED | OUTPATIENT
Start: 2023-11-15 | End: 2023-11-15

## 2023-11-15 RX ORDER — ONDANSETRON 2 MG/ML
4 INJECTION INTRAMUSCULAR; INTRAVENOUS ONCE
Status: COMPLETED | OUTPATIENT
Start: 2023-11-15 | End: 2023-11-15

## 2023-11-15 RX ORDER — ACETAMINOPHEN 325 MG/1
975 TABLET ORAL ONCE
Status: COMPLETED | OUTPATIENT
Start: 2023-11-15 | End: 2023-11-15

## 2023-11-15 RX ORDER — LIDOCAINE 40 MG/G
CREAM TOPICAL
Status: DISCONTINUED | OUTPATIENT
Start: 2023-11-15 | End: 2023-11-15 | Stop reason: HOSPADM

## 2023-11-15 RX ORDER — ONDANSETRON 2 MG/ML
4 INJECTION INTRAMUSCULAR; INTRAVENOUS ONCE
Status: DISCONTINUED | OUTPATIENT
Start: 2023-11-15 | End: 2023-11-15 | Stop reason: HOSPADM

## 2023-11-15 RX ORDER — FENTANYL CITRATE 50 UG/ML
50 INJECTION, SOLUTION INTRAMUSCULAR; INTRAVENOUS EVERY 5 MIN PRN
Status: DISCONTINUED | OUTPATIENT
Start: 2023-11-15 | End: 2023-11-15 | Stop reason: HOSPADM

## 2023-11-15 RX ORDER — NALOXONE HYDROCHLORIDE 0.4 MG/ML
0.4 INJECTION, SOLUTION INTRAMUSCULAR; INTRAVENOUS; SUBCUTANEOUS
Status: DISCONTINUED | OUTPATIENT
Start: 2023-11-15 | End: 2023-11-16 | Stop reason: HOSPADM

## 2023-11-15 RX ORDER — ONDANSETRON 4 MG/1
4 TABLET, ORALLY DISINTEGRATING ORAL EVERY 6 HOURS PRN
Status: DISCONTINUED | OUTPATIENT
Start: 2023-11-15 | End: 2023-11-15 | Stop reason: HOSPADM

## 2023-11-15 RX ORDER — ONDANSETRON 2 MG/ML
4 INJECTION INTRAMUSCULAR; INTRAVENOUS EVERY 6 HOURS PRN
Status: DISCONTINUED | OUTPATIENT
Start: 2023-11-15 | End: 2023-11-15

## 2023-11-15 RX ORDER — FUROSEMIDE 10 MG/ML
INJECTION INTRAMUSCULAR; INTRAVENOUS PRN
Status: DISCONTINUED | OUTPATIENT
Start: 2023-11-15 | End: 2023-11-15

## 2023-11-15 RX ORDER — DEXTROSE, SODIUM CHLORIDE, SODIUM LACTATE, POTASSIUM CHLORIDE, AND CALCIUM CHLORIDE 5; .6; .31; .03; .02 G/100ML; G/100ML; G/100ML; G/100ML; G/100ML
INJECTION, SOLUTION INTRAVENOUS CONTINUOUS
Status: DISCONTINUED | OUTPATIENT
Start: 2023-11-15 | End: 2023-11-15

## 2023-11-15 RX ORDER — NALOXONE HYDROCHLORIDE 0.4 MG/ML
0.4 INJECTION, SOLUTION INTRAMUSCULAR; INTRAVENOUS; SUBCUTANEOUS
Status: DISCONTINUED | OUTPATIENT
Start: 2023-11-15 | End: 2023-11-15 | Stop reason: HOSPADM

## 2023-11-15 RX ORDER — PROPOFOL 10 MG/ML
INJECTION, EMULSION INTRAVENOUS CONTINUOUS PRN
Status: DISCONTINUED | OUTPATIENT
Start: 2023-11-15 | End: 2023-11-15

## 2023-11-15 RX ORDER — ONDANSETRON 2 MG/ML
4 INJECTION INTRAMUSCULAR; INTRAVENOUS EVERY 6 HOURS PRN
Status: DISCONTINUED | OUTPATIENT
Start: 2023-11-15 | End: 2023-11-15 | Stop reason: HOSPADM

## 2023-11-15 RX ORDER — NALOXONE HYDROCHLORIDE 0.4 MG/ML
0.2 INJECTION, SOLUTION INTRAMUSCULAR; INTRAVENOUS; SUBCUTANEOUS
Status: DISCONTINUED | OUTPATIENT
Start: 2023-11-15 | End: 2023-11-15 | Stop reason: HOSPADM

## 2023-11-15 RX ORDER — HEPARIN SODIUM 1000 [USP'U]/ML
70 INJECTION, SOLUTION INTRAVENOUS; SUBCUTANEOUS ONCE
Status: DISCONTINUED | OUTPATIENT
Start: 2023-11-15 | End: 2023-11-15 | Stop reason: HOSPADM

## 2023-11-15 RX ORDER — FENTANYL CITRATE 50 UG/ML
10-20 INJECTION, SOLUTION INTRAMUSCULAR; INTRAVENOUS
Status: DISCONTINUED | OUTPATIENT
Start: 2023-11-15 | End: 2023-11-16

## 2023-11-15 RX ORDER — NALOXONE HYDROCHLORIDE 0.4 MG/ML
0.2 INJECTION, SOLUTION INTRAMUSCULAR; INTRAVENOUS; SUBCUTANEOUS
Status: DISCONTINUED | OUTPATIENT
Start: 2023-11-15 | End: 2023-11-16 | Stop reason: HOSPADM

## 2023-11-15 RX ORDER — BUPIVACAINE HYDROCHLORIDE 2.5 MG/ML
INJECTION, SOLUTION EPIDURAL; INFILTRATION; INTRACAUDAL
Status: COMPLETED | OUTPATIENT
Start: 2023-11-15 | End: 2023-11-15

## 2023-11-15 RX ORDER — HEPARIN SODIUM 1000 [USP'U]/ML
INJECTION, SOLUTION INTRAVENOUS; SUBCUTANEOUS PRN
Status: DISCONTINUED | OUTPATIENT
Start: 2023-11-15 | End: 2023-11-15

## 2023-11-15 RX ORDER — ONDANSETRON 4 MG/1
4 TABLET, ORALLY DISINTEGRATING ORAL EVERY 30 MIN PRN
Status: DISCONTINUED | OUTPATIENT
Start: 2023-11-15 | End: 2023-11-15 | Stop reason: HOSPADM

## 2023-11-15 RX ORDER — PROTAMINE SULFATE 10 MG/ML
50 INJECTION, SOLUTION INTRAVENOUS ONCE
Status: DISCONTINUED | OUTPATIENT
Start: 2023-11-15 | End: 2023-11-15 | Stop reason: HOSPADM

## 2023-11-15 RX ORDER — BISACODYL 10 MG
10 SUPPOSITORY, RECTAL RECTAL DAILY
Status: DISCONTINUED | OUTPATIENT
Start: 2023-11-16 | End: 2023-11-16 | Stop reason: HOSPADM

## 2023-11-15 RX ORDER — KETOROLAC TROMETHAMINE 15 MG/ML
10 INJECTION, SOLUTION INTRAMUSCULAR; INTRAVENOUS EVERY 8 HOURS
Qty: 5 ML | Refills: 0 | Status: DISCONTINUED | OUTPATIENT
Start: 2023-11-15 | End: 2023-11-16 | Stop reason: HOSPADM

## 2023-11-15 RX ORDER — KETAMINE HYDROCHLORIDE 10 MG/ML
INJECTION INTRAMUSCULAR; INTRAVENOUS PRN
Status: DISCONTINUED | OUTPATIENT
Start: 2023-11-15 | End: 2023-11-15

## 2023-11-15 RX ORDER — ONDANSETRON 2 MG/ML
INJECTION INTRAMUSCULAR; INTRAVENOUS PRN
Status: DISCONTINUED | OUTPATIENT
Start: 2023-11-15 | End: 2023-11-15

## 2023-11-15 RX ORDER — SODIUM CHLORIDE, SODIUM LACTATE, POTASSIUM CHLORIDE, CALCIUM CHLORIDE 600; 310; 30; 20 MG/100ML; MG/100ML; MG/100ML; MG/100ML
INJECTION, SOLUTION INTRAVENOUS CONTINUOUS
Status: DISCONTINUED | OUTPATIENT
Start: 2023-11-15 | End: 2023-11-15 | Stop reason: HOSPADM

## 2023-11-15 RX ORDER — AMOXICILLIN 250 MG
1 CAPSULE ORAL 2 TIMES DAILY
Status: DISCONTINUED | OUTPATIENT
Start: 2023-11-15 | End: 2023-11-15

## 2023-11-15 RX ORDER — FAMOTIDINE 20 MG/1
20 TABLET, FILM COATED ORAL DAILY
Status: DISCONTINUED | OUTPATIENT
Start: 2023-11-15 | End: 2023-11-15

## 2023-11-15 RX ORDER — CARDIOPLEG/ORGAN PRESERV NO.1 9-198-2-1
BOTTLE PERFUSION PRN
Status: DISCONTINUED | OUTPATIENT
Start: 2023-11-15 | End: 2023-11-15 | Stop reason: HOSPADM

## 2023-11-15 RX ORDER — OXYCODONE HYDROCHLORIDE 5 MG/1
5-10 TABLET ORAL
Status: DISCONTINUED | OUTPATIENT
Start: 2023-11-15 | End: 2023-11-15

## 2023-11-15 RX ORDER — CEFUROXIME SODIUM 1.5 G/16ML
1.5 INJECTION, POWDER, FOR SOLUTION INTRAVENOUS EVERY 4 HOURS PRN
Status: DISCONTINUED | OUTPATIENT
Start: 2023-11-15 | End: 2023-11-15 | Stop reason: HOSPADM

## 2023-11-15 RX ORDER — HYDROMORPHONE HCL IN WATER/PF 6 MG/30 ML
0.2 PATIENT CONTROLLED ANALGESIA SYRINGE INTRAVENOUS EVERY 5 MIN PRN
Status: DISCONTINUED | OUTPATIENT
Start: 2023-11-15 | End: 2023-11-15 | Stop reason: HOSPADM

## 2023-11-15 RX ORDER — AMOXICILLIN 250 MG
1 CAPSULE ORAL 2 TIMES DAILY
Status: DISCONTINUED | OUTPATIENT
Start: 2023-11-15 | End: 2023-11-16 | Stop reason: HOSPADM

## 2023-11-15 RX ORDER — FLUMAZENIL 0.1 MG/ML
0.2 INJECTION, SOLUTION INTRAVENOUS
Status: DISCONTINUED | OUTPATIENT
Start: 2023-11-15 | End: 2023-11-15 | Stop reason: HOSPADM

## 2023-11-15 RX ORDER — FENTANYL CITRATE 50 UG/ML
25 INJECTION, SOLUTION INTRAMUSCULAR; INTRAVENOUS EVERY 5 MIN PRN
Status: DISCONTINUED | OUTPATIENT
Start: 2023-11-15 | End: 2023-11-15 | Stop reason: HOSPADM

## 2023-11-15 RX ORDER — HYDROMORPHONE HCL IN WATER/PF 6 MG/30 ML
.3-.5 PATIENT CONTROLLED ANALGESIA SYRINGE INTRAVENOUS EVERY 4 HOURS PRN
Status: DISCONTINUED | OUTPATIENT
Start: 2023-11-15 | End: 2023-11-16

## 2023-11-15 RX ORDER — DEXAMETHASONE SODIUM PHOSPHATE 4 MG/ML
8 INJECTION, SOLUTION INTRA-ARTICULAR; INTRALESIONAL; INTRAMUSCULAR; INTRAVENOUS; SOFT TISSUE ONCE
Status: DISCONTINUED | OUTPATIENT
Start: 2023-11-15 | End: 2023-11-15 | Stop reason: HOSPADM

## 2023-11-15 RX ORDER — KETOROLAC TROMETHAMINE 15 MG/ML
10 INJECTION, SOLUTION INTRAMUSCULAR; INTRAVENOUS EVERY 8 HOURS
Status: DISCONTINUED | OUTPATIENT
Start: 2023-11-15 | End: 2023-11-15

## 2023-11-15 RX ORDER — PROCHLORPERAZINE MALEATE 5 MG
10 TABLET ORAL EVERY 6 HOURS PRN
Status: DISCONTINUED | OUTPATIENT
Start: 2023-11-15 | End: 2023-11-15 | Stop reason: HOSPADM

## 2023-11-15 RX ORDER — DIPHENHYDRAMINE HCL 25 MG
1 TABLET ORAL
COMMUNITY

## 2023-11-15 RX ORDER — MANNITOL 20 G/100ML
INJECTION, SOLUTION INTRAVENOUS PRN
Status: DISCONTINUED | OUTPATIENT
Start: 2023-11-15 | End: 2023-11-15

## 2023-11-15 RX ORDER — AMOXICILLIN 250 MG
2 CAPSULE ORAL 2 TIMES DAILY
Status: DISCONTINUED | OUTPATIENT
Start: 2023-11-15 | End: 2023-11-16 | Stop reason: HOSPADM

## 2023-11-15 RX ORDER — KETOROLAC TROMETHAMINE 30 MG/ML
INJECTION, SOLUTION INTRAMUSCULAR; INTRAVENOUS PRN
Status: DISCONTINUED | OUTPATIENT
Start: 2023-11-15 | End: 2023-11-15

## 2023-11-15 RX ORDER — ACETAMINOPHEN 325 MG/1
1 TABLET ORAL DAILY PRN
COMMUNITY

## 2023-11-15 RX ORDER — PROCHLORPERAZINE MALEATE 10 MG
10 TABLET ORAL EVERY 6 HOURS PRN
Status: DISCONTINUED | OUTPATIENT
Start: 2023-11-15 | End: 2023-11-16 | Stop reason: HOSPADM

## 2023-11-15 RX ORDER — FAMOTIDINE 20 MG/1
20 TABLET, FILM COATED ORAL DAILY
Status: DISCONTINUED | OUTPATIENT
Start: 2023-11-15 | End: 2023-11-16 | Stop reason: HOSPADM

## 2023-11-15 RX ORDER — SODIUM CHLORIDE, SODIUM LACTATE, POTASSIUM CHLORIDE, CALCIUM CHLORIDE 600; 310; 30; 20 MG/100ML; MG/100ML; MG/100ML; MG/100ML
1-3 INJECTION, SOLUTION INTRAVENOUS CONTINUOUS
Status: DISCONTINUED | OUTPATIENT
Start: 2023-11-15 | End: 2023-11-15 | Stop reason: HOSPADM

## 2023-11-15 RX ORDER — MAGNESIUM SULFATE HEPTAHYDRATE 40 MG/ML
2 INJECTION, SOLUTION INTRAVENOUS ONCE
Status: COMPLETED | OUTPATIENT
Start: 2023-11-15 | End: 2023-11-15

## 2023-11-15 RX ORDER — OXYCODONE HYDROCHLORIDE 5 MG/1
5-10 TABLET ORAL
Status: DISCONTINUED | OUTPATIENT
Start: 2023-11-15 | End: 2023-11-16 | Stop reason: HOSPADM

## 2023-11-15 RX ORDER — PROPOFOL 10 MG/ML
INJECTION, EMULSION INTRAVENOUS PRN
Status: DISCONTINUED | OUTPATIENT
Start: 2023-11-15 | End: 2023-11-15

## 2023-11-15 RX ORDER — ONDANSETRON 4 MG/1
4 TABLET, ORALLY DISINTEGRATING ORAL EVERY 6 HOURS PRN
Status: DISCONTINUED | OUTPATIENT
Start: 2023-11-15 | End: 2023-11-16 | Stop reason: HOSPADM

## 2023-11-15 RX ORDER — HYDROMORPHONE HCL IN WATER/PF 6 MG/30 ML
0.4 PATIENT CONTROLLED ANALGESIA SYRINGE INTRAVENOUS EVERY 5 MIN PRN
Status: DISCONTINUED | OUTPATIENT
Start: 2023-11-15 | End: 2023-11-15 | Stop reason: HOSPADM

## 2023-11-15 RX ORDER — BISACODYL 10 MG
10 SUPPOSITORY, RECTAL RECTAL DAILY
Status: DISCONTINUED | OUTPATIENT
Start: 2023-11-16 | End: 2023-11-15

## 2023-11-15 RX ORDER — ONDANSETRON 2 MG/ML
4 INJECTION INTRAMUSCULAR; INTRAVENOUS EVERY 30 MIN PRN
Status: DISCONTINUED | OUTPATIENT
Start: 2023-11-15 | End: 2023-11-15 | Stop reason: HOSPADM

## 2023-11-15 RX ORDER — LIDOCAINE HYDROCHLORIDE 20 MG/ML
INJECTION, SOLUTION INFILTRATION; PERINEURAL PRN
Status: DISCONTINUED | OUTPATIENT
Start: 2023-11-15 | End: 2023-11-15

## 2023-11-15 RX ORDER — PROCHLORPERAZINE MALEATE 5 MG
10 TABLET ORAL EVERY 6 HOURS PRN
Status: DISCONTINUED | OUTPATIENT
Start: 2023-11-15 | End: 2023-11-15

## 2023-11-15 RX ORDER — GLYCOPYRROLATE 0.2 MG/ML
INJECTION, SOLUTION INTRAMUSCULAR; INTRAVENOUS PRN
Status: DISCONTINUED | OUTPATIENT
Start: 2023-11-15 | End: 2023-11-15

## 2023-11-15 RX ORDER — KETOROLAC TROMETHAMINE 30 MG/ML
15 INJECTION, SOLUTION INTRAMUSCULAR; INTRAVENOUS ONCE
Status: DISCONTINUED | OUTPATIENT
Start: 2023-11-15 | End: 2023-11-15 | Stop reason: HOSPADM

## 2023-11-15 RX ORDER — FENTANYL CITRATE 50 UG/ML
25-50 INJECTION, SOLUTION INTRAMUSCULAR; INTRAVENOUS
Status: DISCONTINUED | OUTPATIENT
Start: 2023-11-15 | End: 2023-11-15 | Stop reason: HOSPADM

## 2023-11-15 RX ORDER — AMOXICILLIN 250 MG
2 CAPSULE ORAL 2 TIMES DAILY
Status: DISCONTINUED | OUTPATIENT
Start: 2023-11-15 | End: 2023-11-15

## 2023-11-15 RX ORDER — ACETAMINOPHEN 325 MG/1
650 TABLET ORAL EVERY 6 HOURS
Status: DISCONTINUED | OUTPATIENT
Start: 2023-11-15 | End: 2023-11-16 | Stop reason: HOSPADM

## 2023-11-15 RX ORDER — ONDANSETRON 2 MG/ML
4 INJECTION INTRAMUSCULAR; INTRAVENOUS EVERY 6 HOURS PRN
Status: DISCONTINUED | OUTPATIENT
Start: 2023-11-15 | End: 2023-11-16 | Stop reason: HOSPADM

## 2023-11-15 RX ORDER — HEPARIN SODIUM 5000 [USP'U]/.5ML
5000 INJECTION, SOLUTION INTRAVENOUS; SUBCUTANEOUS EVERY 12 HOURS
Status: DISCONTINUED | OUTPATIENT
Start: 2023-11-15 | End: 2023-11-16 | Stop reason: HOSPADM

## 2023-11-15 RX ORDER — ONDANSETRON 4 MG/1
4 TABLET, ORALLY DISINTEGRATING ORAL EVERY 6 HOURS PRN
Status: DISCONTINUED | OUTPATIENT
Start: 2023-11-15 | End: 2023-11-15

## 2023-11-15 RX ORDER — ACETAMINOPHEN 325 MG/1
650 TABLET ORAL EVERY 6 HOURS
Status: DISCONTINUED | OUTPATIENT
Start: 2023-11-15 | End: 2023-11-15

## 2023-11-15 RX ORDER — CEFUROXIME SODIUM 1.5 G/16ML
1.5 INJECTION, POWDER, FOR SOLUTION INTRAVENOUS
Status: COMPLETED | OUTPATIENT
Start: 2023-11-15 | End: 2023-11-15

## 2023-11-15 RX ORDER — DEXAMETHASONE SODIUM PHOSPHATE 4 MG/ML
INJECTION, SOLUTION INTRA-ARTICULAR; INTRALESIONAL; INTRAMUSCULAR; INTRAVENOUS; SOFT TISSUE PRN
Status: DISCONTINUED | OUTPATIENT
Start: 2023-11-15 | End: 2023-11-15

## 2023-11-15 RX ORDER — PROTAMINE SULFATE 10 MG/ML
INJECTION, SOLUTION INTRAVENOUS PRN
Status: DISCONTINUED | OUTPATIENT
Start: 2023-11-15 | End: 2023-11-15

## 2023-11-15 RX ORDER — DEXTROSE, SODIUM CHLORIDE, SODIUM LACTATE, POTASSIUM CHLORIDE, AND CALCIUM CHLORIDE 5; .6; .31; .03; .02 G/100ML; G/100ML; G/100ML; G/100ML; G/100ML
INJECTION, SOLUTION INTRAVENOUS CONTINUOUS
Status: DISCONTINUED | OUTPATIENT
Start: 2023-11-15 | End: 2023-11-16

## 2023-11-15 RX ORDER — GABAPENTIN 300 MG/1
300 CAPSULE ORAL ONCE
Status: COMPLETED | OUTPATIENT
Start: 2023-11-15 | End: 2023-11-15

## 2023-11-15 RX ADMIN — SUGAMMADEX 200 MG: 100 INJECTION, SOLUTION INTRAVENOUS at 10:56

## 2023-11-15 RX ADMIN — FENTANYL CITRATE 100 MCG: 50 INJECTION INTRAMUSCULAR; INTRAVENOUS at 06:03

## 2023-11-15 RX ADMIN — PROTAMINE SULFATE 30 MG: 10 INJECTION, SOLUTION INTRAVENOUS at 09:35

## 2023-11-15 RX ADMIN — GABAPENTIN 300 MG: 300 CAPSULE ORAL at 04:32

## 2023-11-15 RX ADMIN — HEPARIN SODIUM 3000 UNITS: 1000 INJECTION INTRAVENOUS; SUBCUTANEOUS at 09:32

## 2023-11-15 RX ADMIN — FUROSEMIDE 10 MG: 10 INJECTION, SOLUTION INTRAVENOUS at 08:39

## 2023-11-15 RX ADMIN — ONDANSETRON 4 MG: 2 INJECTION INTRAMUSCULAR; INTRAVENOUS at 10:04

## 2023-11-15 RX ADMIN — KETOROLAC TROMETHAMINE 10 MG: 15 INJECTION INTRAMUSCULAR; INTRAVENOUS at 17:48

## 2023-11-15 RX ADMIN — Medication 20 MG: at 08:19

## 2023-11-15 RX ADMIN — MAGNESIUM SULFATE HEPTAHYDRATE 2 G: 40 INJECTION, SOLUTION INTRAVENOUS at 06:15

## 2023-11-15 RX ADMIN — LIDOCAINE HYDROCHLORIDE 100 MG: 20 INJECTION, SOLUTION INFILTRATION; PERINEURAL at 06:03

## 2023-11-15 RX ADMIN — SODIUM CHLORIDE, POTASSIUM CHLORIDE, SODIUM LACTATE AND CALCIUM CHLORIDE: 600; 310; 30; 20 INJECTION, SOLUTION INTRAVENOUS at 05:52

## 2023-11-15 RX ADMIN — Medication 20 MG: at 07:06

## 2023-11-15 RX ADMIN — ACETAMINOPHEN 650 MG: 325 TABLET, FILM COATED ORAL at 14:26

## 2023-11-15 RX ADMIN — GLYCOPYRROLATE 0.2 MG: 0.2 INJECTION, SOLUTION INTRAMUSCULAR; INTRAVENOUS at 08:53

## 2023-11-15 RX ADMIN — Medication 10 MG: at 08:54

## 2023-11-15 RX ADMIN — Medication 50 MG: at 06:03

## 2023-11-15 RX ADMIN — MANNITOL 12.5 G: 20 INJECTION, SOLUTION INTRAVENOUS at 08:39

## 2023-11-15 RX ADMIN — CEFUROXIME 1.5 G: 1.5 INJECTION, POWDER, FOR SOLUTION INTRAVENOUS at 06:17

## 2023-11-15 RX ADMIN — SODIUM CHLORIDE, POTASSIUM CHLORIDE, SODIUM LACTATE AND CALCIUM CHLORIDE: 600; 310; 30; 20 INJECTION, SOLUTION INTRAVENOUS at 06:10

## 2023-11-15 RX ADMIN — SENNOSIDES AND DOCUSATE SODIUM 2 TABLET: 8.6; 5 TABLET ORAL at 15:49

## 2023-11-15 RX ADMIN — CEFUROXIME 1.5 G: 1.5 INJECTION, POWDER, FOR SOLUTION INTRAVENOUS at 10:21

## 2023-11-15 RX ADMIN — ACETAMINOPHEN 975 MG: 325 TABLET, FILM COATED ORAL at 04:33

## 2023-11-15 RX ADMIN — ACETAMINOPHEN 650 MG: 325 TABLET, FILM COATED ORAL at 17:48

## 2023-11-15 RX ADMIN — PROPOFOL 150 MG: 10 INJECTION, EMULSION INTRAVENOUS at 06:03

## 2023-11-15 RX ADMIN — Medication 20 MG: at 09:27

## 2023-11-15 RX ADMIN — Medication 10 MG: at 09:41

## 2023-11-15 RX ADMIN — FENTANYL CITRATE 100 MCG: 50 INJECTION INTRAMUSCULAR; INTRAVENOUS at 07:00

## 2023-11-15 RX ADMIN — Medication 10 MG: at 08:35

## 2023-11-15 RX ADMIN — HYDROMORPHONE HYDROCHLORIDE 0.5 MG: 1 INJECTION, SOLUTION INTRAMUSCULAR; INTRAVENOUS; SUBCUTANEOUS at 10:29

## 2023-11-15 RX ADMIN — Medication 10 MG: at 09:27

## 2023-11-15 RX ADMIN — Medication 20 MG: at 06:37

## 2023-11-15 RX ADMIN — PROPOFOL 20 MCG/KG/MIN: 10 INJECTION, EMULSION INTRAVENOUS at 06:15

## 2023-11-15 RX ADMIN — GLYCOPYRROLATE 0.2 MG: 0.2 INJECTION, SOLUTION INTRAMUSCULAR; INTRAVENOUS at 06:45

## 2023-11-15 RX ADMIN — FAMOTIDINE 20 MG: 20 TABLET ORAL at 15:49

## 2023-11-15 RX ADMIN — MIDAZOLAM 2 MG: 1 INJECTION INTRAMUSCULAR; INTRAVENOUS at 05:49

## 2023-11-15 RX ADMIN — ONDANSETRON 4 MG: 4 TABLET, ORALLY DISINTEGRATING ORAL at 15:49

## 2023-11-15 RX ADMIN — SENNOSIDES AND DOCUSATE SODIUM 2 TABLET: 8.6; 5 TABLET ORAL at 20:18

## 2023-11-15 RX ADMIN — BUPIVACAINE HYDROCHLORIDE 30 ML: 2.5 INJECTION, SOLUTION EPIDURAL; INFILTRATION; INTRACAUDAL; PERINEURAL at 11:02

## 2023-11-15 RX ADMIN — SODIUM CHLORIDE, SODIUM LACTATE, POTASSIUM CHLORIDE, CALCIUM CHLORIDE AND DEXTROSE MONOHYDRATE: 5; 600; 310; 30; 20 INJECTION, SOLUTION INTRAVENOUS at 12:22

## 2023-11-15 RX ADMIN — PROCHLORPERAZINE EDISYLATE 10 MG: 5 INJECTION INTRAMUSCULAR; INTRAVENOUS at 18:13

## 2023-11-15 RX ADMIN — HEPARIN SODIUM 5000 UNITS: 5000 INJECTION, SOLUTION INTRAVENOUS; SUBCUTANEOUS at 17:48

## 2023-11-15 RX ADMIN — BUPIVACAINE 20 ML: 13.3 INJECTION, SUSPENSION, LIPOSOMAL INFILTRATION at 11:02

## 2023-11-15 RX ADMIN — KETOROLAC TROMETHAMINE 30 MG: 30 INJECTION, SOLUTION INTRAMUSCULAR at 10:04

## 2023-11-15 RX ADMIN — Medication 10 MG: at 07:33

## 2023-11-15 RX ADMIN — HYDROMORPHONE HYDROCHLORIDE 0.5 MG: 1 INJECTION, SOLUTION INTRAMUSCULAR; INTRAVENOUS; SUBCUTANEOUS at 09:49

## 2023-11-15 RX ADMIN — DEXAMETHASONE SODIUM PHOSPHATE 10 MG: 4 INJECTION, SOLUTION INTRA-ARTICULAR; INTRALESIONAL; INTRAMUSCULAR; INTRAVENOUS; SOFT TISSUE at 06:03

## 2023-11-15 ASSESSMENT — ACTIVITIES OF DAILY LIVING (ADL)
ADLS_ACUITY_SCORE: 20
ADLS_ACUITY_SCORE: 22
ADLS_ACUITY_SCORE: 22
ADLS_ACUITY_SCORE: 20
ADLS_ACUITY_SCORE: 20

## 2023-11-15 NOTE — ANESTHESIA PROCEDURE NOTES
TAP Procedure Note    Pre-Procedure   Staff -        Anesthesiologist:  Hussain Ramirez MD       Resident/Fellow: Ruben Aguayo MD       Performed By: resident       Location: OR       Pre-Anesthestic Checklist: patient identified, IV checked, site marked, risks and benefits discussed, informed consent, monitors and equipment checked, pre-op evaluation, at physician/surgeon's request and post-op pain management  Timeout:       Correct Patient: Yes        Correct Procedure: Yes        Correct Site: Yes        Correct Position: Yes        Correct Laterality: Yes        Site Marked: Yes  Procedure Documentation  Procedure: TAP       Diagnosis: POSTOPERATIVE ANALGESIA       Laterality: bilateral       Patient Position: supine       Patient Prep/Sterile Barriers: sterile gloves, mask       Skin prep: DuraPrep and Chloraprep       Needle Type: short bevel       Needle Gauge: 21.        Needle Length (millimeters): 110        Ultrasound guided       1. Ultrasound was used to identify targeted nerve, plexus, vascular marker, or fascial plane and place a needle adjacent to it in real-time.       2. Ultrasound was used to visualize the spread of anesthetic in close proximity to the above referenced structure.       3. A permanent image is entered into the patient's record.    Assessment/Narrative         The placement was negative for: blood aspirated, painful injection and site bleeding       Paresthesias: No.       Bolus given via needle. no blood aspirated via catheter.        Secured via.        Insertion/Infusion Method: Single Shot       Complications: none       Injection made incrementally with aspirations every 5 mL.    Medication(s) Administered   Bupivacaine 0.25% PF (Infiltration) - Infiltration   30 mL - 11/15/2023 11:02:00 AM  Bupivacaine liposome (Exparel) 1.3% LA inj susp (Infiltration) - Infiltration   20 mL - 11/15/2023 11:02:00 AM    FOR [facility] ([facility]) ONLY:   Pain Team Contact information: please  "page the Pain Team Via [facility]. Search \"Pain\". During daytime hours, please page the attending first. At night please page the resident first.      "

## 2023-11-15 NOTE — ANESTHESIA PROCEDURE NOTES
Airway         Procedure Start/Stop Times: 11/15/2023 6:06 AM  Staff -        Anesthesiologist:  Elian Yates MD       Resident/Fellow: Lamberto Irving MD       Performed By: residentIndications and Patient Condition       Induction type:intravenous       Mask difficulty assessment: 1 - vent by mask    Final Airway Details       Final airway type: endotracheal airway       Successful airway: ETT - single  Endotracheal Airway Details        Cuffed: yes       Successful intubation technique: direct laryngoscopy       DL Blade Type: MAC 3       Grade View of Cords: 2       Adjucts: stylet       Position: Right       Measured from: gums/teeth       Secured at (cm): 22       Bite block used: None    Post intubation assessment        Number of attempts at approach: 1       Number of other approaches attempted: 0       Secured with: pink tape       Ease of procedure: easy       Dentition: Intact and Unchanged    Medication(s) Administered   Medication Administration Time: 11/15/2023 6:06 AM

## 2023-11-15 NOTE — PROGRESS NOTES
Admitted/transferred from: PACU  Time of arrival on unit 1430  2 RN full  skin assessment completed by Precious Alvares, KASI and Brooke MCCALL RN.  Skin assessment finding: Three lap sites post kidney donation surgery, ALTHEA, liquid bandage.   Interventions/actions: skin interventions No interventions needed at this time.      Will continue to monitor.

## 2023-11-15 NOTE — PROGRESS NOTES
"  Transplant Surgery Progress Note  Post Op Check    11/15/2023    Lori Harris is a 53 year old female POD#0 s/p Procedure(s):  Laparoscopic Hand Assisted Living Non-Directed Kidney Donor Kidney Paired Donor Kidney to be Shipped for Pre-Op Diagnosis Codes:     * Encounter for donation of kidney [Z52.4]    Pt reports their pain is controlled with current regimen. Denies nausea, SOB, chest pain, or dizziness. Patient Is passing flatus but has not yet had a BM and Is voiding via urinary catheter. Patient tolerating sips and chips with nausea or vomiting.    BP (!) 133/90   Pulse 75   Temp 98.4  F (36.9  C) (Core)   Resp 18   Ht 1.727 m (5' 8\")   Wt 64.3 kg (141 lb 12.1 oz)   SpO2 98%   BMI 21.55 kg/m      Gen: A&O x4, NAD   Chest: breathing non-labored on room air  Abdomen: soft, non-tender, non-distended, midline incision and 2 port sites closed with dermabond, abdominal binder in place  Incision: clean, dry, intact closed with dermabond  Extremities: warm and well perfused    A/P: No acute post-op issues. Continue plan of care per primary team. Please call with any questions.    Tre Roque MD  PGY-1 Surgery    "

## 2023-11-15 NOTE — ANESTHESIA CARE TRANSFER NOTE
Patient: Lori Harris    Procedure: Procedure(s):  Laparoscopic Hand Assisted Living Non-Directed Kidney Donor Kidney Paired Donor Kidney to be Shipped       Diagnosis: Encounter for donation of kidney [Z52.4]  Diagnosis Additional Information: No value filed.    Anesthesia Type:   General     Note:    Oropharynx: oropharynx clear of all foreign objects and spontaneously breathing  Level of Consciousness: awake and drowsy  Oxygen Supplementation: nasal cannula  Level of Supplemental Oxygen (L/min / FiO2): 4  Independent Airway: airway patency satisfactory and stable  Dentition: dentition unchanged  Vital Signs Stable: post-procedure vital signs reviewed and stable  Report to RN Given: handoff report given  Patient transferred to: PACU    Handoff Report: Identifed the Patient, Identified the Reponsible Provider, Reviewed the pertinent medical history, Discussed the surgical course, Reviewed Intra-OP anesthesia mangement and issues during anesthesia, Set expectations for post-procedure period and Allowed opportunity for questions and acknowledgement of understanding    Vitals:  Vitals Value Taken Time   /63 11/15/23 1110   Temp     Pulse 87 11/15/23 1114   Resp     SpO2 98 % 11/15/23 1114   Vitals shown include unfiled device data.    Electronically Signed By: Lamberto Irving MD  November 15, 2023  11:15 AM

## 2023-11-15 NOTE — OP NOTE
Transplant Surgery  Operative Note     Procedure Date:  11/15/23    Preoperative Diagnosis:  Healthy kidney donor    Postoperative Diagnosis:  Healthy kidney donor    Procedure:  1. Left Kidney donor living donor nephrectomy for donation          Secondary Procedure:    None    Surgeon:    Surgeon(s) and Role:     * Dulce Joyner MD - Primary     * Tre Roque MD - Resident - Assisting     * Damaso Ryan MD - Fellow - Assisting    Fellow/Assistant:    MD Connor Fellow. There was no qualified assistant to assist with this procedure.    Specimen:  Left kidney and ureter    Anesthesia:    General    Urine Output: see anasthesia records  Estimated Blood Loss: 25 mls   Fluids administered: 2.5 L     Intra Op Events: tortuous gonadal complex as observed on preoperative CT scan     Complications: None.    Findings: single ureter, artery, vein      None.        Donor UNOS ID:    [id number]  Flush Start time:  11/15/2023  9:37 AM    Arterial Clamp:    11/15/2023  9:33 AM  Arterial anatomy:  single    Venous anatomy:    single  Ureteral anatomy:   single     Indication: Lori Harris presents for Left Kidney donation. The patient has undergone a thorough medical and psychosocial evaluation and was found suitable for voluntary kidney donation. Risks and benefits of donation were discussed. The patient expressed understanding, was willing to proceed, and provided informed consent.    Final ABO/Crossmatch verification: Prior to incision, I verified the donor ABO and recipient ABO. I visually verified that the donor identification, blood type, and other vital data were compatible with the recipient.      Operative Procedure:  Lori Harris was transported to the operating room, placed on the operating table in the right lateral decubitus position, and a universal timeout was performed. Sequential compression devices were placed on both lower extremities and general endotracheal anesthesia was  induced. The patient was given IV antibiotics and Brooks catheter.  The abdomen was shaved, prepped, and draped in the usual sterile fashion.    We made a 6 cm upper midline incision and carried down thru the linea alba. The peritoneum was opened under direct vision. The hand port was put into position and a left lower quadrant 10 mm port was placed over a trocar with hand assistance. Pneumoperitoneum with CO2 was provided to 12 mmHg. General survey with the laparoscope revealed no unusual findings. An additional 10 mm port was placed in the left lateral abdomen under direct vision.     We released the left colon from its lateral attachments and rotated medially, revealing the kidney and ureter. The ureter was circumferentially dissected free distally toward the pelvis then kidney taking care to preserve its vasculature. The gonadal vein was identified and dissected enbloc with the ureter, and the proximal gonadal vein was doubly ligated and divided near the insertion into the left renal vein. The left adrenal vein was likewise identified, ligated and divided. The renal vein was then circumferentially cleared of extraneous tissue. The kidney and ureter were dissected free posteriorly from the psoas and multiple lumbar veins were clipped and divided. There was a tortuous gonadal complex as identified on preoperative CT scan.     We created a plane between the left adrenal gland and the upper pole of the kidney with the harmonic scalpel and the upper pole attachments were released. The anterior and inferior aspects of the renal artery at its origin were cleared of investing lymphatics. The patient was given fluid, mannitol and lasix, and the kidney was then dissected free from its lateral attachments allowing full medial rotation. The remaining lymphatics and fat around the renal artery was cleared. The patient was heparinized and the distal ureter and gonadal vein were clipped and divided. Good urine flow was seen. A  laparoscopic TANYA stapler was fired across the renal artery and then the renal vein.     The kidney was delivered from the hand port, flushed, and placed in cold storage. Protamine was administered for full heparin reversal. Pneumoperitoneum was reestablished and hemostasis was obtained. Vascular transection sites were visualized and confirmed secure. The colon was placed back in its natural position. The 10 mm port sites were closed with 0-vicryl. The hand port was closed with 0-PDS. Skin incisions were irrigated and closed with 4-0 monocryl and Dermabond. Needle, sponge, and instrument counts were correct x2.  Faculty was present for key portions of the procedure. The patient tolerated the procedure well without apparent complications and was extubated and transferred to PACU in good condition.   Arturo Ryan MD  Transplant Surgery Fellow    I was present during the key portions of the procedure, and I was immediately available for the entire procedure. There was no qualified resident available to assist with the entire procedure. The fellow Dr Ryan noted above participated as the first assistant in all parts of the dictated procedure and the intern Dr Toledo was the assistanty in the opening and closure with assistance from me as needed.

## 2023-11-15 NOTE — PLAN OF CARE
"/78 (BP Location: Left arm)   Pulse 75   Temp 97.9  F (36.6  C) (Oral)   Resp 18   Ht 1.727 m (5' 8\")   Wt 64.3 kg (141 lb 12.1 oz)   SpO2 99%   BMI 21.55 kg/m      Patient arrived at 1400 from PACU after kidney donation surgery.    Shift: [id number]  VS: Stable on RA, afebrile.  Neuro: AOx4  BG: N/A  Labs: WDL  Respiratory: WDL  Pain/Nausea/PRN: Patient reports pain 2/10, scheduled tylenol given.  Diet: Regular diet.  LDA: R PIV - SL, L PIV - running D5LR at 60/hr.  GI/: Brooks catheter with good urine output, no BM.  Skin: Abdominal lap sites x3, ALTHEA, liquid bandage closure, abdominal binder applied.  Mobility: Up for first walk this afternoon.  Plan: Continue plan of care.     Handoff given to following RN.    "

## 2023-11-15 NOTE — PHARMACY-ADMISSION MEDICATION HISTORY
Pharmacist Admission Medication History    Admission medication history is complete. The information provided in this note is only as accurate as the sources available at the time of the update.    Information Source(s): Patient and CareEverywhere/SureScripts via phone    Changes made to PTA medication list:  Added:   Ferrous sulfate - patient reports using slow release iron as needed for anemia.  Acetaminophen - patient reports using on as needed basis for mild pain.  Excedrin Migraine - patient reports using on as needed basis for headaches.  Diphenhydramine - patient reports using on as needed basis for sleep.  Deleted: None  Changed:   Levothyroxine 75 mcg tablet to 88 mcg tablet. Per patient, uses 88 mcg tablet, which matches pharmacy fill history.     Medication Affordability:  Not including over the counter (OTC) medications, was there a time in the past 3 months when you did not take your medications as prescribed because of cost?: No    Allergies reviewed with patient and updates made in EHR: yes    Medication History Completed By: LUDWIN NIETO RPH 11/15/2023 4:29 PM    PTA Med List   Medication Sig Last Dose    acetaminophen (TYLENOL) 325 MG tablet Take 1 tablet by mouth daily as needed for mild pain 11/11/2023 at PRN    aspirin-acetaminophen-caffeine (EXCEDRIN MIGRAINE) 250-250-65 MG tablet Take 1 tablet by mouth daily as needed for headaches Past Month at PRN    buPROPion (WELLBUTRIN XL) 150 MG 24 hr tablet Take 150 mg by mouth every morning 11/14/2023 at 0900    diphenhydrAMINE (BENADRYL) 25 MG tablet Take 1 tablet by mouth nightly as needed for sleep 11/13/23 at HS    Ferrous Sulfate (SLOW FE PO) Take 1 tablet by mouth daily as needed (anemia) 11/11/2023 at PRN    levothyroxine (SYNTHROID/LEVOTHROID) 88 MCG tablet Take 1 tablet by mouth daily 11/14/2023 at 0730    losartan (COZAAR) 50 MG tablet Take 25 mg by mouth daily 11/14/2023 at 0730    Naratriptan HCl (AMERGE PO) Take 1 mg by mouth every 4 hours  as needed for migraine Past Month at PRN

## 2023-11-15 NOTE — ANESTHESIA POSTPROCEDURE EVALUATION
Patient: Lori Harris    Procedure: Procedure(s):  Laparoscopic Hand Assisted Living Non-Directed Kidney Donor Kidney Paired Donor Kidney to be Shipped       Anesthesia Type:  General    Note:  Disposition: Inpatient   Postop Pain Control: Uneventful            Sign Out: Well controlled pain   PONV: No   Neuro/Psych: Uneventful            Sign Out: Acceptable/Baseline neuro status   Airway/Respiratory: Uneventful            Sign Out: Acceptable/Baseline resp. status   CV/Hemodynamics: Uneventful            Sign Out: Acceptable CV status; No obvious hypovolemia; No obvious fluid overload   Other NRE: NONE   DID A NON-ROUTINE EVENT OCCUR? No           Last vitals:  Vitals Value Taken Time   /74 11/15/23 1230   Temp 36.9  C (98.4  F) 11/15/23 1115   Pulse 75 11/15/23 1254   Resp 16 11/15/23 1230   SpO2 98 % 11/15/23 1254   Vitals shown include unfiled device data.    Electronically Signed By: Hudson Alcala MD  November 15, 2023  12:55 PM

## 2023-11-15 NOTE — PHARMACY-TRANSPLANT NOTE
D/I: 53 year old female s/p kidney donation surgery on 11/15/2023.  Medications have been reviewed by the pharmacist for efficacy, appropriate dose, medication interactions and potential adverse effects.      A: Medications reviewed for this patient as above. No medication issues were noted.  P: Pharmacy will continue to monitor for any potential medication issues, and will make recommendations as appropriate. Medication therapy needs for discharge planning will continue to be addressed throughout the current admission via multidisciplinary rounds and order review.

## 2023-11-16 VITALS
OXYGEN SATURATION: 97 % | HEART RATE: 74 BPM | HEIGHT: 68 IN | DIASTOLIC BLOOD PRESSURE: 95 MMHG | SYSTOLIC BLOOD PRESSURE: 143 MMHG | TEMPERATURE: 98.3 F | WEIGHT: 147 LBS | BODY MASS INDEX: 22.28 KG/M2 | RESPIRATION RATE: 16 BRPM

## 2023-11-16 PROBLEM — G89.18 ACUTE POST-OPERATIVE PAIN: Status: ACTIVE | Noted: 2023-11-16

## 2023-11-16 LAB
ALBUMIN UR-MCNC: NEGATIVE MG/DL
APPEARANCE UR: CLEAR
BILIRUB UR QL STRIP: NEGATIVE
BUN SERPL-MCNC: 8.7 MG/DL (ref 6–20)
CK SERPL-CCNC: 234 U/L (ref 26–192)
COLOR UR AUTO: ABNORMAL
CREAT SERPL-MCNC: 1.11 MG/DL (ref 0.51–0.95)
EGFRCR SERPLBLD CKD-EPI 2021: 60 ML/MIN/1.73M2
GLUCOSE UR STRIP-MCNC: NEGATIVE MG/DL
HCT VFR BLD AUTO: 38.3 % (ref 35–47)
HGB BLD-MCNC: 12.8 G/DL (ref 11.7–15.7)
HGB UR QL STRIP: ABNORMAL
KETONES UR STRIP-MCNC: NEGATIVE MG/DL
LEUKOCYTE ESTERASE UR QL STRIP: NEGATIVE
NITRATE UR QL: NEGATIVE
PH UR STRIP: 6 [PH] (ref 5–7)
RBC URINE: 27 /HPF
SP GR UR STRIP: 1.01 (ref 1–1.03)
UROBILINOGEN UR STRIP-MCNC: NORMAL MG/DL
WBC URINE: 3 /HPF

## 2023-11-16 PROCEDURE — 250N000013 HC RX MED GY IP 250 OP 250 PS 637

## 2023-11-16 PROCEDURE — 82550 ASSAY OF CK (CPK): CPT | Performed by: STUDENT IN AN ORGANIZED HEALTH CARE EDUCATION/TRAINING PROGRAM

## 2023-11-16 PROCEDURE — 250N000013 HC RX MED GY IP 250 OP 250 PS 637: Performed by: STUDENT IN AN ORGANIZED HEALTH CARE EDUCATION/TRAINING PROGRAM

## 2023-11-16 PROCEDURE — 85014 HEMATOCRIT: CPT | Performed by: STUDENT IN AN ORGANIZED HEALTH CARE EDUCATION/TRAINING PROGRAM

## 2023-11-16 PROCEDURE — 81001 URINALYSIS AUTO W/SCOPE: CPT | Performed by: STUDENT IN AN ORGANIZED HEALTH CARE EDUCATION/TRAINING PROGRAM

## 2023-11-16 PROCEDURE — 250N000013 HC RX MED GY IP 250 OP 250 PS 637: Performed by: NURSE PRACTITIONER

## 2023-11-16 PROCEDURE — 84520 ASSAY OF UREA NITROGEN: CPT | Performed by: STUDENT IN AN ORGANIZED HEALTH CARE EDUCATION/TRAINING PROGRAM

## 2023-11-16 PROCEDURE — 82565 ASSAY OF CREATININE: CPT | Performed by: STUDENT IN AN ORGANIZED HEALTH CARE EDUCATION/TRAINING PROGRAM

## 2023-11-16 PROCEDURE — 36415 COLL VENOUS BLD VENIPUNCTURE: CPT | Performed by: STUDENT IN AN ORGANIZED HEALTH CARE EDUCATION/TRAINING PROGRAM

## 2023-11-16 PROCEDURE — 250N000011 HC RX IP 250 OP 636: Performed by: STUDENT IN AN ORGANIZED HEALTH CARE EDUCATION/TRAINING PROGRAM

## 2023-11-16 RX ORDER — HYDROXYZINE HYDROCHLORIDE 25 MG/1
25 TABLET, FILM COATED ORAL EVERY 6 HOURS PRN
Status: DISCONTINUED | OUTPATIENT
Start: 2023-11-16 | End: 2023-11-16 | Stop reason: HOSPADM

## 2023-11-16 RX ORDER — LEVOTHYROXINE SODIUM 88 UG/1
88 TABLET ORAL DAILY
Status: DISCONTINUED | OUTPATIENT
Start: 2023-11-16 | End: 2023-11-16 | Stop reason: HOSPADM

## 2023-11-16 RX ORDER — AMOXICILLIN 250 MG
1-2 CAPSULE ORAL 2 TIMES DAILY PRN
Qty: 60 TABLET | Refills: 0 | Status: SHIPPED | OUTPATIENT
Start: 2023-11-16

## 2023-11-16 RX ORDER — METHOCARBAMOL 500 MG/1
500 TABLET, FILM COATED ORAL 4 TIMES DAILY PRN
Status: DISCONTINUED | OUTPATIENT
Start: 2023-11-16 | End: 2023-11-16 | Stop reason: HOSPADM

## 2023-11-16 RX ORDER — METHOCARBAMOL 500 MG/1
500 TABLET, FILM COATED ORAL 4 TIMES DAILY PRN
Qty: 15 TABLET | Refills: 0 | Status: SHIPPED | OUTPATIENT
Start: 2023-11-16

## 2023-11-16 RX ORDER — HEPARIN SODIUM 5000 [USP'U]/.5ML
5000 INJECTION, SOLUTION INTRAVENOUS; SUBCUTANEOUS ONCE
Status: DISCONTINUED | OUTPATIENT
Start: 2023-11-16 | End: 2023-11-16

## 2023-11-16 RX ORDER — LOSARTAN POTASSIUM 25 MG/1
25 TABLET ORAL DAILY
Status: DISCONTINUED | OUTPATIENT
Start: 2023-11-16 | End: 2023-11-16 | Stop reason: HOSPADM

## 2023-11-16 RX ORDER — OXYCODONE HYDROCHLORIDE 5 MG/1
2.5-5 TABLET ORAL EVERY 4 HOURS PRN
Qty: 10 TABLET | Refills: 0 | Status: SHIPPED | OUTPATIENT
Start: 2023-11-16

## 2023-11-16 RX ORDER — BUPROPION HYDROCHLORIDE 150 MG/1
150 TABLET ORAL EVERY MORNING
Status: DISCONTINUED | OUTPATIENT
Start: 2023-11-16 | End: 2023-11-16 | Stop reason: HOSPADM

## 2023-11-16 RX ORDER — HYDROXYZINE HYDROCHLORIDE 25 MG/1
50 TABLET, FILM COATED ORAL EVERY 6 HOURS PRN
Status: DISCONTINUED | OUTPATIENT
Start: 2023-11-16 | End: 2023-11-16 | Stop reason: HOSPADM

## 2023-11-16 RX ADMIN — HEPARIN SODIUM 5000 UNITS: 5000 INJECTION, SOLUTION INTRAVENOUS; SUBCUTANEOUS at 06:04

## 2023-11-16 RX ADMIN — ACETAMINOPHEN 650 MG: 325 TABLET, FILM COATED ORAL at 00:12

## 2023-11-16 RX ADMIN — LOSARTAN POTASSIUM 25 MG: 25 TABLET, FILM COATED ORAL at 12:31

## 2023-11-16 RX ADMIN — BISACODYL 10 MG: 10 SUPPOSITORY RECTAL at 06:23

## 2023-11-16 RX ADMIN — SENNOSIDES AND DOCUSATE SODIUM 2 TABLET: 8.6; 5 TABLET ORAL at 08:34

## 2023-11-16 RX ADMIN — KETOROLAC TROMETHAMINE 10 MG: 15 INJECTION INTRAMUSCULAR; INTRAVENOUS at 01:01

## 2023-11-16 RX ADMIN — FAMOTIDINE 20 MG: 20 TABLET ORAL at 08:34

## 2023-11-16 RX ADMIN — OXYCODONE HYDROCHLORIDE 5 MG: 5 TABLET ORAL at 04:40

## 2023-11-16 RX ADMIN — KETOROLAC TROMETHAMINE 10 MG: 15 INJECTION INTRAMUSCULAR; INTRAVENOUS at 09:29

## 2023-11-16 RX ADMIN — ACETAMINOPHEN 650 MG: 325 TABLET, FILM COATED ORAL at 06:05

## 2023-11-16 RX ADMIN — ACETAMINOPHEN 650 MG: 325 TABLET, FILM COATED ORAL at 12:30

## 2023-11-16 RX ADMIN — BUPROPION HYDROCHLORIDE 150 MG: 150 TABLET, FILM COATED, EXTENDED RELEASE ORAL at 08:34

## 2023-11-16 RX ADMIN — HYDROXYZINE HYDROCHLORIDE 25 MG: 25 TABLET ORAL at 00:34

## 2023-11-16 RX ADMIN — LEVOTHYROXINE SODIUM 88 MCG: 88 TABLET ORAL at 08:34

## 2023-11-16 RX ADMIN — SODIUM CHLORIDE, SODIUM LACTATE, POTASSIUM CHLORIDE, CALCIUM CHLORIDE AND DEXTROSE MONOHYDRATE: 5; 600; 310; 30; 20 INJECTION, SOLUTION INTRAVENOUS at 00:13

## 2023-11-16 ASSESSMENT — ACTIVITIES OF DAILY LIVING (ADL)
ADLS_ACUITY_SCORE: 22
ADLS_ACUITY_SCORE: 22
ADLS_ACUITY_SCORE: 24
ADLS_ACUITY_SCORE: 24
ADLS_ACUITY_SCORE: 22
ADLS_ACUITY_SCORE: 24
ADLS_ACUITY_SCORE: 24
ADLS_ACUITY_SCORE: 22
ADLS_ACUITY_SCORE: 22

## 2023-11-16 NOTE — PLAN OF CARE
Goal Outcome Evaluation:      Plan of Care Reviewed With: patient    Overall Patient Progress: improvingOverall Patient Progress: improving    Outcome Evaluation: When medically ready, Lori will discharge to her home in [place] with the support of her family as caregivers.

## 2023-11-16 NOTE — DISCHARGE SUMMARY
[facility]    Discharge Summary  Transplant Surgery    Date of Admission:  11/15/2023  Date of Discharge:  11/16/2023  Discharging Provider: Tierra Almazan NP / Dimas Hopkins MD PhD    Discharge Diagnoses   Principal Problem:    Encounter for donation of kidney  Active Problems:    Acute post-operative pain    Procedure/Surgery Information   Procedure Date:  11/15/23    Preoperative Diagnosis:  Healthy kidney donor    Postoperative Diagnosis:  Healthy kidney donor    Procedure:  1. Left Kidney donor living donor nephrectomy for donation    Secondary Procedure:  None    Surgeon:     Surgeon(s) and Role:     * Dulce Joyner MD - Primary     * Tre Roque MD - Resident - Assisting     * Damaso Ryan MD - Fellow - Assisting    Fellow/Assistant:     MD Connor Fellow. There was no qualified assistant to assist with this procedure.    Specimen:  Left kidney and ureter    Anesthesia:  General     Non-operative procedures 11/15/23 TAP procedure     History of Present Illness   Lori Harris is a 53 year old female with a past medical history significant for mild HTN, breast cancer s/p bilateral mastectomy, depression, hypothyroidism, and migraines who is now s/p Laparascopic left donor nephrectomy on 11/15/23 with Dr. Joyner.     Hospital Course   Lori Harris was admitted on 11/15/2023.  The following problems were addressed during her hospitalization:    s/p Left donor nephrectomy 11/15/23: POD#1. Recovering as expected. Tolerating regular diet. Brooks removed and voiding spontaneously. Passing gas and passed small amount of stool. Up ad arina in room and pollard. Pain control as below. Will discharge home in stable condition with follow up in 2 weeks.      Acute post op pain: Pain at incision controlled on current regimen of PRN Tylenol, oxycodone, and Robaxin. Continue bowel regimen with opioids and recent surgery.      Donor coordinator:  Soha Jackson [phone]      Tierra Almazan NP    Discharge Disposition   Discharged to home   Condition at discharge: Stable    Primary Care Physician   Carolina Sinha    Physical Exam   Temp: 98.3  F (36.8  C) Temp src: Oral BP: (!) 143/95 Pulse: 74   Resp: 16 SpO2: 97 % O2 Device: None (Room air)    Vitals:    11/15/23 0500 11/16/23 0053   Weight: 64.3 kg (141 lb 12.1 oz) 66.7 kg (147 lb)     Vital Signs with Ranges  Temp:  [97.7  F (36.5  C)-98.5  F (36.9  C)] 98.3  F (36.8  C)  Pulse:  [66-75] 74  Resp:  [13-20] 16  BP: (115-143)/(78-95) 143/95  SpO2:  [95 %-99 %] 97 %  I/O last 3 completed shifts:  In: 3140 [P.O.:840; I.V.:2300]  Out: 3785 [Urine:3240; Emesis/NG output:520; Blood:25]    Constitutional: resting comfortably in bed  Eyes: EOMI; corrective lenses in place  Respiratory: unlabored on RA  Cardiovascular: perfused  GI: abdomen soft. Incision and lap sites C/D/I.   Genitourinary: Brooks removed  Skin: warm, dry  Musculoskeletal: no edema noted  Neurologic: A&Ox4  Neuropsychiatric: pleasant    Consultations This Hospital Stay   PHARMACY IP CONSULT  SOT MEDICATION HISTORY IP PHARMACY CONSULT  PHARMACY IP CONSULT  SOT MEDICATION HISTORY IP PHARMACY CONSULT  CARE MANAGEMENT / SOCIAL WORK IP CONSULT    Time Spent on this Encounter   I have spent greater than 30 minutes on this discharge.    Discharge Orders   Discharge Medications   Current Discharge Medication List        START taking these medications    Details   methocarbamol (ROBAXIN) 500 MG tablet Take 1 tablet (500 mg) by mouth 4 times daily as needed for muscle spasms (Adjuvant pain)  Qty: 15 tablet, Refills: 0    Associated Diagnoses: Acute post-operative pain      oxyCODONE (ROXICODONE) 5 MG tablet Take 0.5-1 tablets (2.5-5 mg) by mouth every 4 hours as needed for moderate to severe pain  Qty: 10 tablet, Refills: 0    Associated Diagnoses: Acute post-operative pain      senna-docusate (SENOKOT-S/PERICOLACE) 8.6-50 MG tablet Take 1-2  tablets by mouth 2 times daily as needed for constipation  Qty: 60 tablet, Refills: 0    Associated Diagnoses: Encounter for donation of kidney           CONTINUE these medications which have CHANGED    Details   aspirin-acetaminophen-caffeine (EXCEDRIN MIGRAINE) 250-250-65 MG tablet Take 1 tablet by mouth daily as needed for headaches Hold this medication for now and discuss at your post-op appointment with Dr. Joyner    Associated Diagnoses: Nonintractable headache, unspecified chronicity pattern, unspecified headache type           CONTINUE these medications which have NOT CHANGED    Details   acetaminophen (TYLENOL) 325 MG tablet Take 1 tablet by mouth daily as needed for mild pain      buPROPion (WELLBUTRIN XL) 150 MG 24 hr tablet Take 150 mg by mouth every morning      diphenhydrAMINE (BENADRYL) 25 MG tablet Take 1 tablet by mouth nightly as needed for sleep      Ferrous Sulfate (SLOW FE PO) Take 1 tablet by mouth daily as needed (anemia)      levothyroxine (SYNTHROID/LEVOTHROID) 88 MCG tablet Take 1 tablet by mouth daily      losartan (COZAAR) 50 MG tablet Take 25 mg by mouth daily      Naratriptan HCl (AMERGE PO) Take 1 mg by mouth every 4 hours as needed for migraine                Reason for your hospital stay    You were admitted for kidney donation. You are recovering as expected and discharging home in stable condition with close follow up.     Activity    Your activity upon discharge: Don't lift anything heavier than 10 pounds for 8 weeks (or longer if your surgeon has discussed this with you).  Walk regularly.    OK to drive only after no longer taking narcotics AND you feel comfortable working the breaks/clutch suddenly if needed.  Limit sports and strenuous activities/'core' exercises for 8 weeks.  If you experience pain after exertion, try an ice pack or warm pack to the area.   Wear abdominal binder for comfort if you desire.    You have been instructed to avoid NSAIDs (medications such as  "ibuprofen, \"Motrin\", naproxen, diclofenac) as these medications may affect the remaining kidney. Take other medications as prescribed.    Keep narcotics out of reach of children. When finished with them, please destroy/discard any remaining pills responsibly. Often, your Count includes the [facility] government office, local police station or pharmacy will have a drug deposit box.     Adult [facility] Follow-up and recommended labs and tests    Follow up with Dr. Joyner in Transplant Clinic in 2-3 weeks.  If you need to change your appointment please contact your coordinator at [phone].     When to contact your care team    Your transplant coordinator if you have any of the following:   Swelling, oozing, worsening pain, unusual redness around the incision  Fever of 101 F or higher   Increasing abdominal pain   Nausea or vomiting   Severe diarrhea, bloating, or constipation     Any concerns or questions, please call your Transplant coordinator:    Phone: [phone].  If they are not available, the on call coordinator/MD will help you with your concern.  If you are unable to reach a coordinator and have an urgent medical questions, please call the hospital at [phone] and ask to have the Pancreas Transplant Surgery fellow on-call paged.     Wound care and dressings    Instructions to care for your wound at home: If your incisions have GLUE, gently wash with soap and water, but do not scrub. Do not soak in a bath until the glue has naturally fallen off and any openings are closed (at least 2 weeks).    If your incisions have STERI STRIPS, leave steri strips in place until they curl and peel off. Please don't shower until 48 hours after surgery. Then gently wash with soap and water, but do not scrub them. Do not soak in a bath until the strips have naturally fallen off and any openings are closed (at least 2 weeks).     Diet    Follow this diet upon discharge: Keep yourself well hydrated (goal 1500 ml/day).   Eat lightly the " first week as tolerated and avoid constipating foods.  If you are constipated, take a stool softener like Senna, Dulcolax, Miralax, or a Fleets enema (available over the counter).         Data   Most Recent 3 CBC's:  Recent Labs   Lab Test 11/16/23  0534 11/15/23  1128 11/06/23  1334 10/09/23  1505 09/25/23  0837 08/26/22  0657   WBC  --  17.3*  --  5.5 3.9* 4.3   HGB 12.8 12.7 14.4  --  13.6 14.0   MCV  --  88  --   --  83 89   PLT  --  212  --   --  265 295      Most Recent 3 BMP's:  Recent Labs   Lab Test 11/16/23  0534 11/15/23  1127 11/15/23  0509 11/06/23  1334 09/25/23  0837 08/26/22  0657   NA  --   --   --   --  139 136   POTASSIUM  --   --   --   --  3.6 3.5   CHLORIDE  --   --   --   --  105 106   CO2  --   --   --   --  25 28   BUN 8.7  --   --   --  10.8 10   CR 1.11*  --   --  0.74 0.87 0.86   ANIONGAP  --   --   --   --  9 2*   LYNN  --   --   --   --  9.1 8.8   GLC  --  179* 115*  --  85 91     Most Recent 2 LFT's:  Recent Labs   Lab Test 09/25/23  0837 08/26/22  0657   AST 20 14   ALT 11 13   ALKPHOS 55 48   BILITOTAL 0.5 0.7     Most Recent INR's and Anticoagulation Dosing History:  Anticoagulation Dose History          Latest Ref Rng & Units 4/19/2013 8/26/2022 9/25/2023 10/9/2023   Recent Dosing and Labs   INR 0.85 - 1.15 1.06  1.01  1.22  0.99      Most Recent 3 Troponin's:No lab results found.  Most Recent Cholesterol Panel:  Recent Labs   Lab Test 09/25/23  0837   CHOL 169   LDL 73   HDL 87   TRIG 45     Most Recent 6 Bacteria Isolates From Any Culture (See EPIC Reports for Culture Details):No lab results found.  Most Recent TSH, T4 and A1c Labs:  Recent Labs   Lab Test 09/25/23  0837 05/23/23  1024   TSH  --  1.62   A1C 5.3  --      Results for orders placed or performed during the hospital encounter of 11/15/23   POC US Guidance Needle Placement    Narrative    Transversus abdominus plane block    Impression    Transversus abdominus plane block

## 2023-11-16 NOTE — PLAN OF CARE
[id number]  VS: stable, on room air.  BG: none  Labs: pending collection  Pain/Nausea: Denies nausea.  Scheduled tylenol and toradol given.  PRN oxy 5mg X 1.   Diet: Regular diet.  Pt tolerated pudding and > 600cc of water.    LDA: PIV X 2 saline locked.    GI: + bowel sounds.  Dulcolax suppository given this morning.    : Brooks with great urine output.  Brooks removed at 6am.  Pt instructed to call when she needs to void. Hat in toilet.    Skin: midline incision and 2 lap sites liquid bandaged.  Abdominal binder on when OOB, off while in bed.    Mobility: Pt ambulated in halls, tolerated well.    Pt requested benadryl for sleep (she takes at home) .  Provider notified.  Atarax given. Pt reported a few hours of sleep after.    Plan:  encourage PO intake and ambulation.

## 2023-11-16 NOTE — PLAN OF CARE
"BP (!) (P) 144/95 (BP Location: Left arm)   Pulse (P) 80   Temp (P) 97.7  F (36.5  C) (Oral)   Resp (P) 14   Ht 1.727 m (5' 8\")   Wt 66.7 kg (147 lb)   SpO2 (P) 98%   BMI 22.35 kg/m      Shift: 4620-6666  VS: Stable on RA, afebrile.  Neuro: AOx4  BG: N/A  Labs: WDL  Respiratory: WDL  Pain/Nausea/PRN: Pain managed with scheduled tylenol and toradol.  Diet: Regular, good appetite.   LDA: R and L PIV's removed for discharge.  GI/: Voiding without difficulty, small BM this shift.  Skin: Abdominal lap sites x3, liquid bandage closure, ALTHEA. Discharging with abdominal binder.   Mobility: UAL   Plan: Patient discharged to home at 1530. AVS reviewed with patient, all questions answered. Patient left unit ambulatory with son.     Handoff given to following RN.    "

## 2023-11-16 NOTE — PLAN OF CARE
"/79 (BP Location: Right arm)   Pulse 74   Temp 97.9  F (36.6  C) (Axillary)   Resp 18   Ht 1.727 m (5' 8\")   Wt 64.3 kg (141 lb 12.1 oz)   SpO2 95%   BMI 21.55 kg/m      Shift: 8447-7904  Isolation Status: None  VS: VSS on RA, afebrile. Capno in place  Neuro: Aox4  Behaviors: Calm, pleasant  BG: None  Labs: WBCs 17.3  Respiratory: Diminished in bases  Cardiac: WDL  Pain/Nausea: Intermittent nausea upon eating. Mostly c/o headache pain.  PRN: Zofran x1, Compazine x1  Diet: Regular. Fair appetite.  IV Access: L PIV and R PIV  Infusion(s): D5LR @60ml/hr  Lines/Drains: Brooks in place  GI/: Not passing gas. Gave bowel meds this evening. UOP last emptied 450ml, yellow/straw  Skin: Midline incision, 2 Left lap sites, dermabonded, ALTHEA  Mobility: SBA/Ind with IV pole. Pt went on three (3) walks this evening, tolerating well.  Events/Education: n/a  Plan: Remove Brooks POD 1. Continue with plan of care.   "

## 2023-11-16 NOTE — CONSULTS
Transplant Admission Psychosocial Assessment/Discharge Summary    Patient Name: Lori Harris  : 1970  Age: 53 year old  MRN: [id number]  Date of Initial Social Work Evaluation: 2013 and 2022    LIVING DONOR SOCIAL WORK NOTE:  D:  Lori is a living Kidney donor, POD 1.  I:  I met with Lori at bedside to thank her for her donation and to assess for any psychosocial needs and to assist with discharge planning.  I assessed the patient's mood/affect, plans for recovery, and any feelings of regret or remorse regarding donation.   A:  Lori was resting up in her chair upon my arrival. She appears to be in a good mood and affect is congruent. Lori states that pain is well controlled.  Patient describes donation recovery as smooth so far.  Lori and I discussed how to reach me or another coverage SW should she have any post operative psychosocial needs.  Lori reports no feelings of regret or remorse about donation. She denies any discharge planning needs at this time.  Patient plans to discharge to home with the care and support of her family.   P: Donor  will remain available to assist with any psychosocial and advocacy needs, both inpatient and outpatient, as needed.  Patient is aware of follow-up recommendations and is aware that she may speak with a covering donor SW at any time.     Presenting Information   Living Situation: Lori lives in a house in [place] with her  Kenneth.  If not local, plans for short term stay:  N/A- Lori is local  Previous Functional Status: Independent   Cultural/Language/Spiritual Considerations: White English speaking female     Support System  Primary Support Person: Adult son Jax,  Kenneth, and cousin  Other support:  N/A  Plan for support in immediate post-transplant period: Cousin and  Kenneth    Health Care Directive  Decision Maker: Self   Alternate Decision Maker:  Kenneth  Health Care Directive: Provided education    Mental Health/Coping:  "  History of Mental Health: History of depression. Per last KIRAN check in on 4/4/23: She continues to take Wellbutrin that is managed by her OBGYN. No medication changes recently. She states that her mood goes \"up and down\" and thinks there is a seasonal component. She admits that some mornings it is \"hard to get up\" but she does and her mood is not impacting her functioning. She is not engaged in therapy-- sh admits that it would likely help, but she is worried about finding the right person that she clicks with. 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 She may consider therapy once this busy season is over. SW encouraged her to do so. No SI or other mood concerns. No anxiety or panic attacks.   History of Chemical Health: No major chemical health history per chart review and report today.   Current status: Coping appropriately following donation. Appeared to be in bright spirits during this visit and denied any increased feelings of anxiety or depression at this time. Reports she is mainly focused on doing what she needs to do for discharge.   Coping: Leaning on friends and family for support.   Services Needed/Recommended: None indicated at this time. Sw offered ongoing support and acces to resources should she notice changes in her mental health.    Financial   Income: Full time - able to work from home if needed.   Impact of transplant on income: Will take 2 weeks off work and utilize donor shield reimbursement.   Insurance and medication coverage: [employer]  Financial concerns: None at this time.   Resources needed: Donor Shield wage reimbursement.     Education provided by KIRAN: Social Work role inpatient setting and OP setting    Assessment and recommendations and plan:  When medically ready, Lori will discharge to her home in [place] with the support of her family as caregivers.      Krys Hernandes, ALICE, LICSW  SOT/BMT/CF Float    Donor KIRAN main " phone: [phone]

## 2023-11-16 NOTE — PROGRESS NOTES
INDEPENDENT LIVING DONOR ADVOCATE NOTE:  D: Lori is a living kidney donor, POD 1.  I:  I met with her at bedside to thank her for her donation and to assess for any REBA needs.  I assessed the patient's mood/affect, plans for recovery, and any feelings of regret/remorse regarding donation.  We reviewed the importance of completing follow-up labs and surveys at six months, 1 year and 2 years after donation to monitor kidney health and the impact donation has had on their life post donation.   A:  She is resting comfortably in a chair. She appears to be in a good mood and affect is congruent.  She states that pain is well controlled.  She reports that her recipient was in [place].  Patient describes donation recovery as smooth.  She and I discussed how to reach me should she have any post operative REBA needs.  She reports no feelings of regret or remorse about donation.  She denies any discharge planning needs at this time.  Patient plans to discharge to home with the care and support of her family.   P: REBA will remain available to assist with any support and REBA needs, both inpatient and outpatient, as needed.  Patient is aware of follow-up recommendations and has my contact information.      VIVIAN Cifuentes, CCTSW   Independent Living Donor Advocate  [facility], [facility], [facility]  Direct: [phone]  E-Mail: [email or web address]

## 2023-11-16 NOTE — PHARMACY-TRANSPLANT NOTE
Solid Organ Transplant Donor Prior to Discharge Note  53 year old female s/p kidney donation surgery on 11/15/2023.     Pharmacy has monitored for any potential medication issues.  In anticipation for discharge, medication therapy needs have been addressed daily throughout the current admission via multidisciplinary rounds and/or discussions, order verification, daily clinical pharmacy review, and communication with prescribers.    Daquan Contreras, Pharm.D.  PGY1 Pharmacy Resident

## 2023-11-17 ENCOUNTER — TELEPHONE (OUTPATIENT)
Dept: TRANSPLANT | Facility: CLINIC | Age: 53
End: 2023-11-17
Payer: COMMERCIAL

## 2023-11-17 NOTE — TELEPHONE ENCOUNTER
Called Lori post-hospital discharge from living donor nephrectomy.  She is POD 2.      Lori reports:     Incision: C/D/I, no warmth/swelling/tenderness/discharge     Pain: Is well controlled.  Using minimal Oxycodone.  Weaning to 1-2 pill(s)/day.     Bowels: Has Regular/daily bowel movements.      Appetite: Fair.  Eating small/frequent meals.  No concerns.     Fluids: Drinking ~2L water/day.     Urinary: Voiding clear/yellow urine w/o difficulty.     Activity: Denies SOB, ambulating frequently, tolerating activity with increasing endurance    I will reach out to the [facility] Coordinator to check on general status of the recipient.     Reviewed: abdominal precautions, lifting restrictions.     Next appointment: 11/27 at 1:30 she would like for it to be adjusted later that day due to her  gets off of school at 2pm so would need 2:30 or later.  I have sent message to the schedulers.     Lori knows how to get in touch with me if any questions/concerns arise before her post-op appointment.   Soha Jackson RN  Living Donor Coordinator  11/17/2023 11:12 AM

## 2023-11-21 ENCOUNTER — DOCUMENTATION ONLY (OUTPATIENT)
Dept: TRANSPLANT | Facility: CLINIC | Age: 53
End: 2023-11-21
Payer: COMMERCIAL

## 2023-11-21 DIAGNOSIS — Z52.4 KIDNEY DONOR: Primary | ICD-10-CM

## 2023-11-22 ENCOUNTER — TELEPHONE (OUTPATIENT)
Dept: TRANSPLANT | Facility: CLINIC | Age: 53
End: 2023-11-22
Payer: COMMERCIAL

## 2023-11-27 ENCOUNTER — OFFICE VISIT (OUTPATIENT)
Dept: TRANSPLANT | Facility: CLINIC | Age: 53
End: 2023-11-27
Attending: SURGERY
Payer: COMMERCIAL

## 2023-11-27 VITALS
WEIGHT: 141.8 LBS | DIASTOLIC BLOOD PRESSURE: 90 MMHG | HEART RATE: 79 BPM | TEMPERATURE: 97.6 F | SYSTOLIC BLOOD PRESSURE: 152 MMHG | BODY MASS INDEX: 21.56 KG/M2 | OXYGEN SATURATION: 99 %

## 2023-11-27 DIAGNOSIS — Z52.4 ENCOUNTER FOR DONATION OF KIDNEY: ICD-10-CM

## 2023-11-27 PROCEDURE — 99213 OFFICE O/P EST LOW 20 MIN: CPT | Performed by: SURGERY

## 2023-11-27 PROCEDURE — 99024 POSTOP FOLLOW-UP VISIT: CPT | Mod: GC | Performed by: SURGERY

## 2023-11-27 NOTE — LETTER
11/27/2023         RE: Lori Harris  [address] [facility] [address]        Dear Colleague,    Thank you for referring your patient, Lori Harris, to the [facility]. Please see a copy of my visit note below.    Transplant Surgery - OUTPATIENT PROGRESS NOTE    Date of Visit: 11/27/2023    Transplants:  11/15/2023 (Kidney)  ASSESMENT AND PLAN:  1.s/p Left donor nephrectomy 11/15/23: POD #12. Recovering well. Tolerating diet, ambulating, regular Bms. Incisions c/d/I covered with dermabond. Continue to wear abdominal binder. Follow-up in 6 weeks for clinic appointment and labs.     2.Acute post op pain: Intermittent mild abdominal/back pain controlled with Tylenol. No longer using oxycodone or Robaxin. Continue prn Tylenol.     Date: November 27, 2023          Emi Harris, MS4    I have reviewed history, examined patient and discussed plan with the fellow/resident/NATALIA.  I concur with the findings in this note.    Patient was counseled on complications of incision and short and long term care to minimize infection/hernia risk.    Total time: 20 min  Counseling time: 10 min               Chief Complaint:Post-op Visit (Kidney Donor)  POD 12, recovering well. Endorses intermittent lower abdominal and back pain. Takes Tylenol once daily which improves pain. Passing flatus and having one BM/day. Taking bowel regimen as needed. Ambulating ~ 30 minutes per day although not back to her baseline. Denies fever, chills, rigors, CP, SOB, dysuria, constipation, lower extremity redness/swelling.     History of Present Illness:  Patient Active Problem List   Diagnosis    Transplant donor evaluation    Hypothyroidism    Acquired absence of breast    Breast cancer (H)    Depressive disorder    FH: colon cancer    First degree atrioventricular block    Headache    Iliotibial band syndrome    Migraine without aura    Other extrapyramidal disease and abnormal movement  disorder    Palpitations    Tinnitus of left ear    Seasonal affective disorder (H24)    Persistent disorder of initiating or maintaining sleep    Encounter for donation of kidney    Acute post-operative pain     SOCIAL /FAMILY HISTORY: [x]                  No recent change    Past Medical History:   Diagnosis Date    Carcinoma in situ of right breast 08/2014    Ductal in situ; Stage 0    Depressive disorder, not elsewhere classified     First degree atrioventricular block     Headaches     Hypothyroidism     Irregular heart beat     Other disorder of muscle, ligament, and fascia     Persistent disorder of initiating or maintaining sleep     Vitamin D deficiency      Past Surgical History:   Procedure Laterality Date    COLONOSCOPY N/A 9/23/2015    Procedure: COLONOSCOPY;  Surgeon: Shy Franco MD;  Location:  GI    COLONOSCOPY N/A 10/28/2020    Procedure: COLONOSCOPY;  Surgeon: Shy Franco MD;  Location:  GI    DISSECT LYMPH NODE AXILLA Bilateral 8/25/2014    Procedure: DISSECT LYMPH NODE AXILLA;  Surgeon: Renetta Delatorre MD;  Location: [facility]    EXCISE CYST THYROGLOSSAL DUCT  1988    FLUORESCENCE INTRAOPERATIVE VASCULAR ANGIOGRAPHY Bilateral 8/25/2014    Procedure: FLUORESCENCE INTRAOPERATIVE VASCULAR ANGIOGRAPHY;  Surgeon: Jyoti Richardson MD;  Location: [facility]    GRAFT FAT TO BREAST N/A 1/6/2015    Procedure: GRAFT FAT TO BREAST;  Surgeon: Jyoti Richardson MD;  Location: [facility]    INSERT TISSUE EXPANDER BREAST BILATERAL Bilateral 8/25/2014    Procedure: INSERT TISSUE EXPANDER BREAST BILATERAL;  Surgeon: Jyoti Richardson MD;  Location: [facility]    LAPAROSCOPIC DONOR HAND ASSISTED KIDNEY NON-DIRECTED Left 11/15/2023    Procedure: Laparoscopic Hand Assisted Living Non-Directed Kidney Donor Kidney Paired Donor Kidney to be Shipped;  Surgeon: Dulce Joyner MD;  Location: [facility]    MASTECTOMY, NIPPLE SPARING Bilateral 8/25/2014    Procedure: MASTECTOMY, NIPPLE SPARING;   Surgeon: Renetta Delatorre MD;  Location: [facility]    RECONSTRUCT BREAST BILATERAL, IMPLANT PROSTHESIS BILATERAL, COMBINED Bilateral 1/6/2015    Procedure: COMBINED RECONSTRUCT BREAST BILATERAL, IMPLANT PROSTHESIS BILATERAL;  Surgeon: Jyoti Richardson MD;  Location: [facility]     Social History     Socioeconomic History    Marital status:      Spouse name: Not on file    Number of children: 2    Years of education: Not on file    Highest education level: Not on file   Occupational History    Not on file   Tobacco Use    Smoking status: Never    Smokeless tobacco: Never   Substance and Sexual Activity    Alcohol use: Yes     Alcohol/week: 9.0 standard drinks of alcohol     Types: 9 Standard drinks or equivalent per week     Comment: advised to hold 24hrs prior to DOS    Drug use: No    Sexual activity: Yes     Partners: Male   Other Topics Concern    Parent/sibling w/ CABG, MI or angioplasty before 65F 55M? Not Asked   Social History Narrative    Not on file     Social Determinants of Health     Financial Resource Strain: Not on file   Food Insecurity: Not on file   Transportation Needs: Not on file   Physical Activity: Not on file   Stress: Not on file   Social Connections: Not on file   Interpersonal Safety: Not on file   Housing Stability: Not on file     Prescription Medications as of 11/27/2023         Rx Number Disp Refills Start End Last Dispensed Date Next Fill Date Owning Pharmacy    acetaminophen (TYLENOL) 325 MG tablet            Sig: Take 1 tablet by mouth daily as needed for mild pain    Class: Historical    Route: Oral    aspirin-acetaminophen-caffeine (EXCEDRIN MIGRAINE) 250-250-65 MG tablet    11/16/2023        Sig: Take 1 tablet by mouth daily as needed for headaches Hold this medication for now and discuss at your post-op appointment with Dr. Joyner    Class: No Print Out    Route: Oral    buPROPion (WELLBUTRIN XL) 150 MG 24 hr tablet            Sig: Take 150 mg by mouth every morning     Class: Historical    Route: Oral    diphenhydrAMINE (BENADRYL) 25 MG tablet            Sig: Take 1 tablet by mouth nightly as needed for sleep    Class: Historical    Route: Oral    Ferrous Sulfate (SLOW FE PO)            Sig: Take 1 tablet by mouth daily as needed (anemia)    Class: Historical    Route: Oral    levothyroxine (SYNTHROID/LEVOTHROID) 88 MCG tablet            Sig: Take 1 tablet by mouth daily    Class: Historical    Route: Oral    losartan (COZAAR) 50 MG tablet            Sig: Take 25 mg by mouth daily    Class: Historical    Route: Oral    methocarbamol (ROBAXIN) 500 MG tablet  15 tablet 0 11/16/2023    [place], [place] - [address] [place] [address] [address]    Sig: Take 1 tablet (500 mg) by mouth 4 times daily as needed for muscle spasms (Adjuvant pain)    Class: E-Prescribe    Route: Oral    Naratriptan HCl (AMERGE PO)            Sig: Take 1 mg by mouth every 4 hours as needed for migraine    Class: Historical    Route: Oral    oxyCODONE (ROXICODONE) 5 MG tablet  10 tablet 0 11/16/2023    [address]    Sig: Take 0.5-1 tablets (2.5-5 mg) by mouth every 4 hours as needed for moderate to severe pain    Class: E-Prescribe    Earliest Fill Date: 11/16/2023    Route: Oral    senna-docusate (SENOKOT-S/PERICOLACE) 8.6-50 MG tablet  60 tablet 0 11/16/2023    [place], [place] - [address] [place] [address] [address]    Sig: Take 1-2 tablets by mouth 2 times daily as needed for constipation    Class: E-Prescribe    Route: Oral          No known drug allergy and Seafood   REVIEW OF SYSTEMS (check box if normal)  [x]               GENERAL  [x]                 PULMONARY [x]                GENITOURINARY  [x]                CNS                 [x]                 CARDIAC  [x]                 ENDOCRINE  [x]                EARS,NOSE,THROAT [x]                 GASTROINTESTINAL [x]                 NEUROLOGIC    [x]                 [place]  [x]                  HEMATOLOGY      PHYSICAL EXAM (check box if normal)There were no vitals taken for this visit.    [x]            GENERAL: comfortable, NAD    [x]       EYES:  ICTERIC   []        YES  []                    NO  [x]           EXTREMITIES: Clubbing []       Y     [x]           N    [x]           EARS, NOSE, THROAT: Membranes Moist    YES   [x]                   NO []                  [x]           LUNGS:  CLEAR    YES       [x]                  NO    []                                [x]           SKIN: Jaundice           YES       []                  NO    [x]                   Rash: YES       []                  NO    [x]                                     [x]             HEART: Regular Rate          YES       [x]                  NO    []                   Incision Clean:  YES       [x]                  NO    []                                [x]                    ABDOMEN: Organomegaly YES       []                  NO    [x]                       [x]                    NEUROLOGICAL:  Nonfocal  YES       [x]                  NO    []                       [x]                    Hernia YES       []                  NO    [x]                   PSYCHIATRIC:  Appropriate  YES       [x]                  NO    []                           Again, thank you for allowing me to participate in the care of your patient.        Sincerely,        Dulce Joyner MD

## 2023-11-27 NOTE — PROGRESS NOTES
Transplant Surgery - OUTPATIENT PROGRESS NOTE    Date of Visit: 11/27/2023    Transplants:  11/15/2023 (Kidney)  ASSESMENT AND PLAN:  1.s/p Left donor nephrectomy 11/15/23: POD #12. Recovering well. Tolerating diet, ambulating, regular Bms. Incisions c/d/I covered with dermabond. Continue to wear abdominal binder. Follow-up in 6 weeks for clinic appointment and labs.     2.Acute post op pain: Intermittent mild abdominal/back pain controlled with Tylenol. No longer using oxycodone or Robaxin. Continue prn Tylenol.     Date: November 27, 2023          Emi Harris, MS4    I have reviewed history, examined patient and discussed plan with the fellow/resident/NATALIA.  I concur with the findings in this note.    Patient was counseled on complications of incision and short and long term care to minimize infection/hernia risk.    Total time: 20 min  Counseling time: 10 min               Chief Complaint:Post-op Visit (Kidney Donor)  POD 12, recovering well. Endorses intermittent lower abdominal and back pain. Takes Tylenol once daily which improves pain. Passing flatus and having one BM/day. Taking bowel regimen as needed. Ambulating ~ 30 minutes per day although not back to her baseline. Denies fever, chills, rigors, CP, SOB, dysuria, constipation, lower extremity redness/swelling.     History of Present Illness:  Patient Active Problem List   Diagnosis    Transplant donor evaluation    Hypothyroidism    Acquired absence of breast    Breast cancer (H)    Depressive disorder    FH: colon cancer    First degree atrioventricular block    Headache    Iliotibial band syndrome    Migraine without aura    Other extrapyramidal disease and abnormal movement disorder    Palpitations    Tinnitus of left ear    Seasonal affective disorder (H24)    Persistent disorder of initiating or maintaining sleep    Encounter for donation of kidney    Acute post-operative pain     SOCIAL /FAMILY HISTORY: [x]                  No recent change    Past  Medical History:   Diagnosis Date    Carcinoma in situ of right breast 08/2014    Ductal in situ; Stage 0    Depressive disorder, not elsewhere classified     First degree atrioventricular block     Headaches     Hypothyroidism     Irregular heart beat     Other disorder of muscle, ligament, and fascia     Persistent disorder of initiating or maintaining sleep     Vitamin D deficiency      Past Surgical History:   Procedure Laterality Date    COLONOSCOPY N/A 9/23/2015    Procedure: COLONOSCOPY;  Surgeon: Shy Franco MD;  Location:  GI    COLONOSCOPY N/A 10/28/2020    Procedure: COLONOSCOPY;  Surgeon: Shy Franco MD;  Location:  GI    DISSECT LYMPH NODE AXILLA Bilateral 8/25/2014    Procedure: DISSECT LYMPH NODE AXILLA;  Surgeon: Renetta Delatorre MD;  Location: [facility]    EXCISE CYST THYROGLOSSAL DUCT  1988    FLUORESCENCE INTRAOPERATIVE VASCULAR ANGIOGRAPHY Bilateral 8/25/2014    Procedure: FLUORESCENCE INTRAOPERATIVE VASCULAR ANGIOGRAPHY;  Surgeon: Jyoti Richardson MD;  Location: [facility]    GRAFT FAT TO BREAST N/A 1/6/2015    Procedure: GRAFT FAT TO BREAST;  Surgeon: Jyoti Richardson MD;  Location: [facility]    INSERT TISSUE EXPANDER BREAST BILATERAL Bilateral 8/25/2014    Procedure: INSERT TISSUE EXPANDER BREAST BILATERAL;  Surgeon: Jyoti Richardson MD;  Location: [facility]    LAPAROSCOPIC DONOR HAND ASSISTED KIDNEY NON-DIRECTED Left 11/15/2023    Procedure: Laparoscopic Hand Assisted Living Non-Directed Kidney Donor Kidney Paired Donor Kidney to be Shipped;  Surgeon: Dulce Joyner MD;  Location: [facility]    MASTECTOMY, NIPPLE SPARING Bilateral 8/25/2014    Procedure: MASTECTOMY, NIPPLE SPARING;  Surgeon: Renetta Delatorre MD;  Location: [facility]    RECONSTRUCT BREAST BILATERAL, IMPLANT PROSTHESIS BILATERAL, COMBINED Bilateral 1/6/2015    Procedure: COMBINED RECONSTRUCT BREAST BILATERAL, IMPLANT PROSTHESIS BILATERAL;  Surgeon: Jyoti Richardson MD;  Location: [facility]      Social History     Socioeconomic History    Marital status:      Spouse name: Not on file    Number of children: 2    Years of education: Not on file    Highest education level: Not on file   Occupational History    Not on file   Tobacco Use    Smoking status: Never    Smokeless tobacco: Never   Substance and Sexual Activity    Alcohol use: Yes     Alcohol/week: 9.0 standard drinks of alcohol     Types: 9 Standard drinks or equivalent per week     Comment: advised to hold 24hrs prior to DOS    Drug use: No    Sexual activity: Yes     Partners: Male   Other Topics Concern    Parent/sibling w/ CABG, MI or angioplasty before 65F 55M? Not Asked   Social History Narrative    Not on file     Social Determinants of Health     Financial Resource Strain: Not on file   Food Insecurity: Not on file   Transportation Needs: Not on file   Physical Activity: Not on file   Stress: Not on file   Social Connections: Not on file   Interpersonal Safety: Not on file   Housing Stability: Not on file     Prescription Medications as of 11/27/2023         Rx Number Disp Refills Start End Last Dispensed Date Next Fill Date Owning Pharmacy    acetaminophen (TYLENOL) 325 MG tablet            Sig: Take 1 tablet by mouth daily as needed for mild pain    Class: Historical    Route: Oral    aspirin-acetaminophen-caffeine (EXCEDRIN MIGRAINE) 250-250-65 MG tablet    11/16/2023        Sig: Take 1 tablet by mouth daily as needed for headaches Hold this medication for now and discuss at your post-op appointment with Dr. Joyner    Class: No Print Out    Route: Oral    buPROPion (WELLBUTRIN XL) 150 MG 24 hr tablet            Sig: Take 150 mg by mouth every morning    Class: Historical    Route: Oral    diphenhydrAMINE (BENADRYL) 25 MG tablet            Sig: Take 1 tablet by mouth nightly as needed for sleep    Class: Historical    Route: Oral    Ferrous Sulfate (SLOW FE PO)            Sig: Take 1 tablet by mouth daily as needed (anemia)     Class: Historical    Route: Oral    levothyroxine (SYNTHROID/LEVOTHROID) 88 MCG tablet            Sig: Take 1 tablet by mouth daily    Class: Historical    Route: Oral    losartan (COZAAR) 50 MG tablet            Sig: Take 25 mg by mouth daily    Class: Historical    Route: Oral    methocarbamol (ROBAXIN) 500 MG tablet  15 tablet 0 11/16/2023    [address]    Sig: Take 1 tablet (500 mg) by mouth 4 times daily as needed for muscle spasms (Adjuvant pain)    Class: E-Prescribe    Route: Oral    Naratriptan HCl (AMERGE PO)            Sig: Take 1 mg by mouth every 4 hours as needed for migraine    Class: Historical    Route: Oral    oxyCODONE (ROXICODONE) 5 MG tablet  10 tablet 0 11/16/2023    [address]    Sig: Take 0.5-1 tablets (2.5-5 mg) by mouth every 4 hours as needed for moderate to severe pain    Class: E-Prescribe    Earliest Fill Date: 11/16/2023    Route: Oral    senna-docusate (SENOKOT-S/PERICOLACE) 8.6-50 MG tablet  60 tablet 0 11/16/2023    [address]    Sig: Take 1-2 tablets by mouth 2 times daily as needed for constipation    Class: E-Prescribe    Route: Oral          No known drug allergy and Seafood   REVIEW OF SYSTEMS (check box if normal)  [x]               GENERAL  [x]                 PULMONARY [x]                GENITOURINARY  [x]                CNS                 [x]                 CARDIAC  [x]                 ENDOCRINE  [x]                EARS,NOSE,THROAT [x]                 GASTROINTESTINAL [x]                 NEUROLOGIC    [x]                MUSCLOSKELTAL  [x]                  HEMATOLOGY      PHYSICAL EXAM (check box if normal)There were no vitals taken for this visit.    [x]            GENERAL: comfortable, NAD    [x]       EYES:  ICTERIC   []        YES  []                    NO  [x]           EXTREMITIES: Clubbing  []       Y     [x]           N    [x]           EARS, NOSE, THROAT: Membranes Moist    YES   [x]                   NO []                  [x]           LUNGS:  CLEAR    YES       [x]                  NO    []                                [x]           SKIN: Jaundice           YES       []                  NO    [x]                   Rash: YES       []                  NO    [x]                                     [x]             HEART: Regular Rate          YES       [x]                  NO    []                   Incision Clean:  YES       [x]                  NO    []                                [x]                    ABDOMEN: Organomegaly YES       []                  NO    [x]                       [x]                    NEUROLOGICAL:  Nonfocal  YES       [x]                  NO    []                       [x]                    Hernia YES       []                  NO    [x]                   PSYCHIATRIC:  Appropriate  YES       [x]                  NO    []

## 2023-11-28 ENCOUNTER — TELEPHONE (OUTPATIENT)
Dept: TRANSPLANT | Facility: CLINIC | Age: 53
End: 2023-11-28
Payer: COMMERCIAL

## 2023-11-28 NOTE — TELEPHONE ENCOUNTER
[facility] Call Center    Phone Message    May a detailed message be left on voicemail: yes     Reason for Call: Other: patient called and stated she needs to schedule her 6 week follow up. Patient was seen yesterday there is no current follow up orders or anything mentioned in the note. Does patient need to follow up in 6 weeks. If so please place orders.     Action Taken: Message routed to:  Other: [facility]    Travel Screening: Not Applicable

## 2023-11-29 DIAGNOSIS — Z52.4 KIDNEY DONOR: Primary | ICD-10-CM

## 2023-12-06 ENCOUNTER — DOCUMENTATION ONLY (OUTPATIENT)
Dept: OTHER | Facility: CLINIC | Age: 53
End: 2023-12-06
Payer: COMMERCIAL

## 2023-12-20 ENCOUNTER — TELEPHONE (OUTPATIENT)
Dept: TRANSPLANT | Facility: CLINIC | Age: 53
End: 2023-12-20

## 2023-12-20 NOTE — TELEPHONE ENCOUNTER
Patient Call: General  Route to LPN    Reason for call: Lori calling to update Coordinator, that she's been having Cold symptoms, for over a week.     Call back needed? Yes    Return Call Needed  Same as documented in contacts section  When to return call?: Same day: Route High Priority

## 2024-01-08 ENCOUNTER — OFFICE VISIT (OUTPATIENT)
Dept: TRANSPLANT | Facility: CLINIC | Age: 54
End: 2024-01-08
Attending: SURGERY

## 2024-01-08 ENCOUNTER — LAB (OUTPATIENT)
Dept: LAB | Facility: CLINIC | Age: 54
End: 2024-01-08
Attending: SURGERY
Payer: COMMERCIAL

## 2024-01-08 DIAGNOSIS — Z52.4 KIDNEY DONOR: ICD-10-CM

## 2024-01-08 LAB
ALBUMIN MFR UR ELPH: <6 MG/DL
ALBUMIN UR-MCNC: NEGATIVE MG/DL
APPEARANCE UR: CLEAR
BILIRUB UR QL STRIP: NEGATIVE
COLOR UR AUTO: ABNORMAL
CREAT SERPL-MCNC: 0.97 MG/DL (ref 0.51–0.95)
CREAT UR-MCNC: 14.6 MG/DL
CREAT UR-MCNC: 15.1 MG/DL
EGFRCR SERPLBLD CKD-EPI 2021: 70 ML/MIN/1.73M2
GLUCOSE UR STRIP-MCNC: NEGATIVE MG/DL
HGB UR QL STRIP: NEGATIVE
KETONES UR STRIP-MCNC: NEGATIVE MG/DL
LEUKOCYTE ESTERASE UR QL STRIP: NEGATIVE
MICROALBUMIN UR-MCNC: <12 MG/L
MICROALBUMIN/CREAT UR: NORMAL MG/G{CREAT}
NITRATE UR QL: NEGATIVE
PH UR STRIP: 6.5 [PH] (ref 5–7)
PROT/CREAT 24H UR: NORMAL MG/G{CREAT}
RBC URINE: <1 /HPF
SP GR UR STRIP: 1 (ref 1–1.03)
SQUAMOUS EPITHELIAL: 3 /HPF
UROBILINOGEN UR STRIP-MCNC: NORMAL MG/DL
WBC URINE: 0 /HPF

## 2024-01-08 PROCEDURE — 99024 POSTOP FOLLOW-UP VISIT: CPT | Performed by: SURGERY

## 2024-01-08 PROCEDURE — 82570 ASSAY OF URINE CREATININE: CPT | Performed by: SURGERY

## 2024-01-08 PROCEDURE — 36415 COLL VENOUS BLD VENIPUNCTURE: CPT | Performed by: PATHOLOGY

## 2024-01-08 PROCEDURE — 84156 ASSAY OF PROTEIN URINE: CPT | Performed by: PATHOLOGY

## 2024-01-08 PROCEDURE — 82565 ASSAY OF CREATININE: CPT | Performed by: PATHOLOGY

## 2024-01-08 PROCEDURE — 99213 OFFICE O/P EST LOW 20 MIN: CPT | Performed by: SURGERY

## 2024-01-08 PROCEDURE — 81001 URINALYSIS AUTO W/SCOPE: CPT | Performed by: PATHOLOGY

## 2024-01-08 PROCEDURE — 99000 SPECIMEN HANDLING OFFICE-LAB: CPT | Performed by: PATHOLOGY

## 2024-01-08 NOTE — LETTER
1/8/2024         RE: Lori Harris  [address] [facility] [id number]        Dear Colleague,    Thank you for referring your patient, Lori Harris, to the [facility]. Please see a copy of my visit note below.    Transplant Surgery - OUTPATIENT PROGRESS NOTE    Date of Visit: 01/08/2024    Donor nephrectomy:  11/15/2023 (Kidney)  ASSESMENT AND PLAN:  1.s/p Left donor nephrectomy 11/15/23: Recovering well. Tolerating diet, ambulating, regular Bms. Incisions c/d/I, well healed,  no hernia. No need for in person follow up unless issues arise. .         History of Present Illness:  Patient Active Problem List   Diagnosis     Transplant donor evaluation     Hypothyroidism     Acquired absence of breast     Breast cancer (H)     Depressive disorder     FH: colon cancer     First degree atrioventricular block     Headache     Iliotibial band syndrome     Migraine without aura     Other extrapyramidal disease and abnormal movement disorder     Palpitations     Tinnitus of left ear     Seasonal affective disorder (H24)     Persistent disorder of initiating or maintaining sleep     Encounter for donation of kidney     Acute post-operative pain     SOCIAL /FAMILY HISTORY: [x]                  No recent change    Past Medical History:   Diagnosis Date     Carcinoma in situ of right breast 08/2014    Ductal in situ; Stage 0     Depressive disorder, not elsewhere classified      First degree atrioventricular block      Headaches      Hypothyroidism      Irregular heart beat      Other disorder of muscle, ligament, and fascia      Persistent disorder of initiating or maintaining sleep      Vitamin D deficiency      Past Surgical History:   Procedure Laterality Date     COLONOSCOPY N/A 9/23/2015    Procedure: COLONOSCOPY;  Surgeon: Shy Franco MD;  Location:  GI     COLONOSCOPY N/A 10/28/2020    Procedure: COLONOSCOPY;  Surgeon: Shy Franco MD;   Location: [facility]     DISSECT LYMPH NODE AXILLA Bilateral 8/25/2014    Procedure: DISSECT LYMPH NODE AXILLA;  Surgeon: Renetta Delatorre MD;  Location: [facility]     EXCISE CYST THYROGLOSSAL DUCT  1988     FLUORESCENCE INTRAOPERATIVE VASCULAR ANGIOGRAPHY Bilateral 8/25/2014    Procedure: FLUORESCENCE INTRAOPERATIVE VASCULAR ANGIOGRAPHY;  Surgeon: Jyoti Richardson MD;  Location: [facility]     GRAFT FAT TO BREAST N/A 1/6/2015    Procedure: GRAFT FAT TO BREAST;  Surgeon: Jyoti Richardson MD;  Location: [facility]     INSERT TISSUE EXPANDER BREAST BILATERAL Bilateral 8/25/2014    Procedure: INSERT TISSUE EXPANDER BREAST BILATERAL;  Surgeon: Jyoti Richardson MD;  Location: [facility]     LAPAROSCOPIC DONOR HAND ASSISTED KIDNEY NON-DIRECTED Left 11/15/2023    Procedure: Laparoscopic Hand Assisted Living Non-Directed Kidney Donor Kidney Paired Donor Kidney to be Shipped;  Surgeon: Dulce Joyner MD;  Location: [facility]     MASTECTOMY, NIPPLE SPARING Bilateral 8/25/2014    Procedure: MASTECTOMY, NIPPLE SPARING;  Surgeon: Renetta Delatorre MD;  Location: [facility]     RECONSTRUCT BREAST BILATERAL, IMPLANT PROSTHESIS BILATERAL, COMBINED Bilateral 1/6/2015    Procedure: COMBINED RECONSTRUCT BREAST BILATERAL, IMPLANT PROSTHESIS BILATERAL;  Surgeon: Jyoti Richardson MD;  Location: [facility]     Social History     Socioeconomic History     Marital status:      Spouse name: Not on file     Number of children: 2     Years of education: Not on file     Highest education level: Not on file   Occupational History     Not on file   Tobacco Use     Smoking status: Never     Smokeless tobacco: Never   Substance and Sexual Activity     Alcohol use: Yes     Alcohol/week: 9.0 standard drinks of alcohol     Types: 9 Standard drinks or equivalent per week     Comment: advised to hold 24hrs prior to DOS     Drug use: No     Sexual activity: Yes     Partners: Male   Other Topics Concern     Parent/sibling w/ CABG, MI or angioplasty  before 65F 55M? Not Asked   Social History Narrative     Not on file     Social Determinants of Health     Financial Resource Strain: Not on file   Food Insecurity: Not on file   Transportation Needs: Not on file   Physical Activity: Not on file   Stress: Not on file   Social Connections: Not on file   Interpersonal Safety: Not on file   Housing Stability: Not on file     Prescription Medications as of 1/8/2024         Rx Number Disp Refills Start End Last Dispensed Date Next Fill Date Owning Pharmacy    acetaminophen (TYLENOL) 325 MG tablet  -- --  --       Sig: Take 1 tablet by mouth daily as needed for mild pain    Class: Historical    Route: Oral    aspirin-acetaminophen-caffeine (EXCEDRIN MIGRAINE) 250-250-65 MG tablet  -- -- 11/16/2023 --       Sig: Take 1 tablet by mouth daily as needed for headaches Hold this medication for now and discuss at your post-op appointment with Dr. Joyner    Class: No Print Out    Route: Oral    buPROPion (WELLBUTRIN XL) 150 MG 24 hr tablet  -- --  --       Sig: Take 150 mg by mouth every morning    Class: Historical    Route: Oral    diphenhydrAMINE (BENADRYL) 25 MG tablet  -- --  --       Sig: Take 1 tablet by mouth nightly as needed for sleep    Class: Historical    Route: Oral    Ferrous Sulfate (SLOW FE PO)  -- --  --       Sig: Take 1 tablet by mouth daily as needed (anemia)    Class: Historical    Route: Oral    levothyroxine (SYNTHROID/LEVOTHROID) 88 MCG tablet  -- --  --       Sig: Take 1 tablet by mouth daily    Class: Historical    Route: Oral    losartan (COZAAR) 50 MG tablet  -- --  --       Sig: Take 25 mg by mouth daily    Class: Historical    Route: Oral    Naratriptan HCl (AMERGE PO)  -- --  --       Sig: Take 1 mg by mouth every 4 hours as needed for migraine    Class: Historical    Route: Oral    methocarbamol (ROBAXIN) 500 MG tablet  15 tablet 0 11/16/2023 --   [facility] - [place], [place] - [address]    Sig: Take 1 tablet (500  mg) by mouth 4 times daily as needed for muscle spasms (Adjuvant pain)    Class: E-Prescribe    Route: Oral    oxyCODONE (ROXICODONE) 5 MG tablet  10 tablet 0 11/16/2023 --   [place], [place] - [address] [place] [address]    Sig: Take 0.5-1 tablets (2.5-5 mg) by mouth every 4 hours as needed for moderate to severe pain    Class: E-Prescribe    Earliest Fill Date: 11/16/2023    Route: Oral    senna-docusate (SENOKOT-S/PERICOLACE) 8.6-50 MG tablet  60 tablet 0 11/16/2023 --   [place], [place] - [address] [place] [address]    Sig: Take 1-2 tablets by mouth 2 times daily as needed for constipation    Class: E-Prescribe    Route: Oral          No known drug allergy and Seafood   REVIEW OF SYSTEMS (check box if normal)  [x]               GENERAL  [x]                 PULMONARY [x]                GENITOURINARY  [x]                CNS                 [x]                 CARDIAC  [x]                 ENDOCRINE  [x]                EARS,NOSE,THROAT [x]                 GASTROINTESTINAL [x]                 NEUROLOGIC    [x]                MUSCLOSKELTAL  [x]                  HEMATOLOGY      PHYSICAL EXAM (check box if normal)There were no vitals taken for this visit.    [x]            GENERAL: comfortable, NAD    [x]       EYES:  ICTERIC   []        YES  []                    NO  [x]           EXTREMITIES: Clubbing []       Y     [x]           N    [x]           EARS, NOSE, THROAT: Membranes Moist    YES   [x]                   NO []                  [x]           LUNGS:  CLEAR    YES       [x]                  NO    []                                [x]           SKIN: Jaundice           YES       []                  NO    [x]                   Rash: YES       []                  NO    [x]                                     [x]             HEART: Regular Rate          YES       [x]                  NO    []                   Incision Clean:  YES       [x]                  NO    []                                 [x]                    ABDOMEN: Organomegaly YES       []                  NO    [x]                       [x]                    NEUROLOGICAL:  Nonfocal  YES       [x]                  NO    []                       [x]                    Hernia YES       []                  NO    [x]                   PSYCHIATRIC:  Appropriate  YES       [x]                  NO    []                           Again, thank you for allowing me to participate in the care of your patient.        Sincerely,        Dulce Joyner MD

## 2024-01-08 NOTE — PROGRESS NOTES
Transplant Surgery - OUTPATIENT PROGRESS NOTE    Date of Visit: 01/08/2024    Donor nephrectomy:  11/15/2023 (Kidney)  ASSESMENT AND PLAN:  1.s/p Left donor nephrectomy 11/15/23: Recovering well. Tolerating diet, ambulating, regular Bms. Incisions c/d/I, well healed,  no hernia. No need for in person follow up unless issues arise. .         History of Present Illness:  Patient Active Problem List   Diagnosis    Transplant donor evaluation    Hypothyroidism    Acquired absence of breast    Breast cancer (H)    Depressive disorder    FH: colon cancer    First degree atrioventricular block    Headache    Iliotibial band syndrome    Migraine without aura    Other extrapyramidal disease and abnormal movement disorder    Palpitations    Tinnitus of left ear    Seasonal affective disorder (H24)    Persistent disorder of initiating or maintaining sleep    Encounter for donation of kidney    Acute post-operative pain     SOCIAL /FAMILY HISTORY: [x]                  No recent change    Past Medical History:   Diagnosis Date    Carcinoma in situ of right breast 08/2014    Ductal in situ; Stage 0    Depressive disorder, not elsewhere classified     First degree atrioventricular block     Headaches     Hypothyroidism     Irregular heart beat     Other disorder of muscle, ligament, and fascia     Persistent disorder of initiating or maintaining sleep     Vitamin D deficiency      Past Surgical History:   Procedure Laterality Date    COLONOSCOPY N/A 9/23/2015    Procedure: COLONOSCOPY;  Surgeon: Shy Franco MD;  Location:  GI    COLONOSCOPY N/A 10/28/2020    Procedure: COLONOSCOPY;  Surgeon: Shy Franco MD;  Location:  GI    DISSECT LYMPH NODE AXILLA Bilateral 8/25/2014    Procedure: DISSECT LYMPH NODE AXILLA;  Surgeon: Renetta Delatorre MD;  Location:  [facility]    EXCISE CYST THYROGLOSSAL DUCT  1988    FLUORESCENCE INTRAOPERATIVE VASCULAR ANGIOGRAPHY Bilateral 8/25/2014    Procedure: FLUORESCENCE  INTRAOPERATIVE VASCULAR ANGIOGRAPHY;  Surgeon: Jyoti Richardson MD;  Location: [facility]    GRAFT FAT TO BREAST N/A 1/6/2015    Procedure: GRAFT FAT TO BREAST;  Surgeon: Jyoti Richardson MD;  Location: [facility]    INSERT TISSUE EXPANDER BREAST BILATERAL Bilateral 8/25/2014    Procedure: INSERT TISSUE EXPANDER BREAST BILATERAL;  Surgeon: Jyoti Richardson MD;  Location: [facility]    LAPAROSCOPIC DONOR HAND ASSISTED KIDNEY NON-DIRECTED Left 11/15/2023    Procedure: Laparoscopic Hand Assisted Living Non-Directed Kidney Donor Kidney Paired Donor Kidney to be Shipped;  Surgeon: Dulce Joyner MD;  Location: [facility]    MASTECTOMY, NIPPLE SPARING Bilateral 8/25/2014    Procedure: MASTECTOMY, NIPPLE SPARING;  Surgeon: Renetta Delatorre MD;  Location: [facility]    RECONSTRUCT BREAST BILATERAL, IMPLANT PROSTHESIS BILATERAL, COMBINED Bilateral 1/6/2015    Procedure: COMBINED RECONSTRUCT BREAST BILATERAL, IMPLANT PROSTHESIS BILATERAL;  Surgeon: Jyoti Richardson MD;  Location: [facility]     Social History     Socioeconomic History    Marital status:      Spouse name: Not on file    Number of children: 2    Years of education: Not on file    Highest education level: Not on file   Occupational History    Not on file   Tobacco Use    Smoking status: Never    Smokeless tobacco: Never   Substance and Sexual Activity    Alcohol use: Yes     Alcohol/week: 9.0 standard drinks of alcohol     Types: 9 Standard drinks or equivalent per week     Comment: advised to hold 24hrs prior to DOS    Drug use: No    Sexual activity: Yes     Partners: Male   Other Topics Concern    Parent/sibling w/ CABG, MI or angioplasty before 65F 55M? Not Asked   Social History Narrative    Not on file     Social Determinants of Health     Financial Resource Strain: Not on file   Food Insecurity: Not on file   Transportation Needs: Not on file   Physical Activity: Not on file   Stress: Not on file   Social Connections: Not on file   Interpersonal  Safety: Not on file   Housing Stability: Not on file     Prescription Medications as of 1/8/2024         Rx Number Disp Refills Start End Last Dispensed Date Next Fill Date Owning Pharmacy    acetaminophen (TYLENOL) 325 MG tablet  -- --  --       Sig: Take 1 tablet by mouth daily as needed for mild pain    Class: Historical    Route: Oral    aspirin-acetaminophen-caffeine (EXCEDRIN MIGRAINE) 250-250-65 MG tablet  -- -- 11/16/2023 --       Sig: Take 1 tablet by mouth daily as needed for headaches Hold this medication for now and discuss at your post-op appointment with Dr. Joyner    Class: No Print Out    Route: Oral    buPROPion (WELLBUTRIN XL) 150 MG 24 hr tablet  -- --  --       Sig: Take 150 mg by mouth every morning    Class: Historical    Route: Oral    diphenhydrAMINE (BENADRYL) 25 MG tablet  -- --  --       Sig: Take 1 tablet by mouth nightly as needed for sleep    Class: Historical    Route: Oral    Ferrous Sulfate (SLOW FE PO)  -- --  --       Sig: Take 1 tablet by mouth daily as needed (anemia)    Class: Historical    Route: Oral    levothyroxine (SYNTHROID/LEVOTHROID) 88 MCG tablet  -- --  --       Sig: Take 1 tablet by mouth daily    Class: Historical    Route: Oral    losartan (COZAAR) 50 MG tablet  -- --  --       Sig: Take 25 mg by mouth daily    Class: Historical    Route: Oral    Naratriptan HCl (AMERGE PO)  -- --  --       Sig: Take 1 mg by mouth every 4 hours as needed for migraine    Class: Historical    Route: Oral    methocarbamol (ROBAXIN) 500 MG tablet  15 tablet 0 11/16/2023 --   [place], [place] - [address] [place] [address]    Sig: Take 1 tablet (500 mg) by mouth 4 times daily as needed for muscle spasms (Adjuvant pain)    Class: E-Prescribe    Route: Oral    oxyCODONE (ROXICODONE) 5 MG tablet  10 tablet 0 11/16/2023 --   [place], [place] - [address] [place] [address]    Sig: Take 0.5-1 tablets (2.5-5 mg) by mouth every 4 hours as needed  for moderate to severe pain    Class: E-Prescribe    Earliest Fill Date: 11/16/2023    Route: Oral    senna-docusate (SENOKOT-S/PERICOLACE) 8.6-50 MG tablet  60 tablet 0 11/16/2023 --   [facility] - [place], [place] - [address] [place] [address]    Sig: Take 1-2 tablets by mouth 2 times daily as needed for constipation    Class: E-Prescribe    Route: Oral          No known drug allergy and Seafood   REVIEW OF SYSTEMS (check box if normal)  [x]               GENERAL  [x]                 PULMONARY [x]                GENITOURINARY  [x]                CNS                 [x]                 CARDIAC  [x]                 ENDOCRINE  [x]                EARS,NOSE,THROAT [x]                 GASTROINTESTINAL [x]                 NEUROLOGIC    [x]                MUSCLOSKELTAL  [x]                  HEMATOLOGY      PHYSICAL EXAM (check box if normal)There were no vitals taken for this visit.    [x]            GENERAL: comfortable, NAD    [x]       EYES:  ICTERIC   []        YES  []                    NO  [x]           EXTREMITIES: Clubbing []       Y     [x]           N    [x]           EARS, NOSE, THROAT: Membranes Moist    YES   [x]                   NO []                  [x]           LUNGS:  CLEAR    YES       [x]                  NO    []                                [x]           SKIN: Jaundice           YES       []                  NO    [x]                   Rash: YES       []                  NO    [x]                                     [x]             HEART: Regular Rate          YES       [x]                  NO    []                   Incision Clean:  YES       [x]                  NO    []                                [x]                    ABDOMEN: Organomegaly YES       []                  NO    [x]                       [x]                    NEUROLOGICAL:  Nonfocal  YES       [x]                  NO    []                       [x]                    Hernia YES       []                  NO    [x]                    PSYCHIATRIC:  Appropriate  YES       [x]                  NO    []

## 2024-04-10 DIAGNOSIS — Z52.4 KIDNEY DONOR: Primary | ICD-10-CM

## 2024-05-23 ENCOUNTER — LAB (OUTPATIENT)
Dept: LAB | Facility: CLINIC | Age: 54
End: 2024-05-23
Payer: COMMERCIAL

## 2024-05-23 DIAGNOSIS — Z52.4 KIDNEY DONOR: ICD-10-CM

## 2024-05-23 LAB
ALBUMIN UR-MCNC: NEGATIVE MG/DL
APPEARANCE UR: CLEAR
BACTERIA #/AREA URNS HPF: ABNORMAL /HPF
BILIRUB UR QL STRIP: NEGATIVE
COLOR UR AUTO: YELLOW
GLUCOSE UR STRIP-MCNC: NEGATIVE MG/DL
HGB UR QL STRIP: ABNORMAL
KETONES UR STRIP-MCNC: NEGATIVE MG/DL
LEUKOCYTE ESTERASE UR QL STRIP: NEGATIVE
NITRATE UR QL: NEGATIVE
PH UR STRIP: 7 [PH] (ref 5–7)
RBC #/AREA URNS AUTO: ABNORMAL /HPF
SP GR UR STRIP: 1.01 (ref 1–1.03)
SQUAMOUS #/AREA URNS AUTO: ABNORMAL /LPF
UROBILINOGEN UR STRIP-ACNC: 0.2 E.U./DL
WBC #/AREA URNS AUTO: ABNORMAL /HPF

## 2024-05-23 PROCEDURE — 36415 COLL VENOUS BLD VENIPUNCTURE: CPT

## 2024-05-23 PROCEDURE — 82565 ASSAY OF CREATININE: CPT

## 2024-05-23 PROCEDURE — 82043 UR ALBUMIN QUANTITATIVE: CPT

## 2024-05-23 PROCEDURE — 84156 ASSAY OF PROTEIN URINE: CPT

## 2024-05-23 PROCEDURE — 81001 URINALYSIS AUTO W/SCOPE: CPT

## 2024-05-23 PROCEDURE — 82570 ASSAY OF URINE CREATININE: CPT

## 2024-05-24 LAB
ALBUMIN MFR UR ELPH: <6 MG/DL
CREAT SERPL-MCNC: 1.21 MG/DL (ref 0.51–0.95)
CREAT UR-MCNC: 26.3 MG/DL
CREAT UR-MCNC: 26.3 MG/DL
EGFRCR SERPLBLD CKD-EPI 2021: 53 ML/MIN/1.73M2
MICROALBUMIN UR-MCNC: <12 MG/L
MICROALBUMIN/CREAT UR: NORMAL MG/G{CREAT}
PROT/CREAT 24H UR: NORMAL MG/G{CREAT}

## 2024-06-07 DIAGNOSIS — Z52.4 KIDNEY DONOR: Primary | ICD-10-CM

## 2024-06-28 ENCOUNTER — LAB REQUISITION (OUTPATIENT)
Dept: LAB | Facility: CLINIC | Age: 54
End: 2024-06-28
Payer: COMMERCIAL

## 2024-06-28 ENCOUNTER — LAB REQUISITION (OUTPATIENT)
Dept: LAB | Facility: CLINIC | Age: 54
End: 2024-06-28

## 2024-06-28 DIAGNOSIS — Z87.42 PERSONAL HISTORY OF OTHER DISEASES OF THE FEMALE GENITAL TRACT: ICD-10-CM

## 2024-06-28 DIAGNOSIS — E03.9 HYPOTHYROIDISM, UNSPECIFIED: ICD-10-CM

## 2024-06-28 DIAGNOSIS — Z12.4 ENCOUNTER FOR SCREENING FOR MALIGNANT NEOPLASM OF CERVIX: ICD-10-CM

## 2024-06-28 LAB — TSH SERPL DL<=0.005 MIU/L-ACNC: 1.35 UIU/ML (ref 0.3–4.2)

## 2024-06-28 PROCEDURE — 84443 ASSAY THYROID STIM HORMONE: CPT | Mod: ORL | Performed by: OBSTETRICS & GYNECOLOGY

## 2024-06-28 PROCEDURE — 88305 TISSUE EXAM BY PATHOLOGIST: CPT | Performed by: PATHOLOGY

## 2024-06-28 PROCEDURE — 87624 HPV HI-RISK TYP POOLED RSLT: CPT | Mod: ORL | Performed by: OBSTETRICS & GYNECOLOGY

## 2024-06-28 PROCEDURE — G0145 SCR C/V CYTO,THINLAYER,RESCR: HCPCS | Mod: ORL | Performed by: OBSTETRICS & GYNECOLOGY

## 2024-07-02 LAB
HPV HR 12 DNA CVX QL NAA+PROBE: NEGATIVE
HPV16 DNA CVX QL NAA+PROBE: NEGATIVE
HPV18 DNA CVX QL NAA+PROBE: NEGATIVE
HUMAN PAPILLOMA VIRUS FINAL DIAGNOSIS: NORMAL
PATH REPORT.COMMENTS IMP SPEC: NORMAL
PATH REPORT.COMMENTS IMP SPEC: NORMAL
PATH REPORT.FINAL DX SPEC: NORMAL
PATH REPORT.GROSS SPEC: NORMAL
PATH REPORT.MICROSCOPIC SPEC OTHER STN: NORMAL
PATH REPORT.RELEVANT HX SPEC: NORMAL
PHOTO IMAGE: NORMAL

## 2024-07-05 LAB
BKR LAB AP GYN ADEQUACY: NORMAL
BKR LAB AP GYN INTERPRETATION: NORMAL
PATH REPORT.COMMENTS IMP SPEC: NORMAL
PATH REPORT.COMMENTS IMP SPEC: NORMAL

## 2024-08-04 ENCOUNTER — HEALTH MAINTENANCE LETTER (OUTPATIENT)
Age: 54
End: 2024-08-04

## 2024-11-04 ENCOUNTER — LAB (OUTPATIENT)
Dept: LAB | Facility: CLINIC | Age: 54
End: 2024-11-04
Payer: COMMERCIAL

## 2024-11-04 DIAGNOSIS — Z52.4 KIDNEY DONOR: ICD-10-CM

## 2024-11-04 LAB
ALBUMIN UR-MCNC: NEGATIVE MG/DL
APPEARANCE UR: CLEAR
BACTERIA #/AREA URNS HPF: ABNORMAL /HPF
BILIRUB UR QL STRIP: NEGATIVE
COLOR UR AUTO: YELLOW
GLUCOSE UR STRIP-MCNC: NEGATIVE MG/DL
HGB UR QL STRIP: ABNORMAL
KETONES UR STRIP-MCNC: NEGATIVE MG/DL
LEUKOCYTE ESTERASE UR QL STRIP: ABNORMAL
NITRATE UR QL: NEGATIVE
PH UR STRIP: 6 [PH] (ref 5–7)
RBC #/AREA URNS AUTO: ABNORMAL /HPF
SP GR UR STRIP: <=1.005 (ref 1–1.03)
SQUAMOUS #/AREA URNS AUTO: ABNORMAL /LPF
UROBILINOGEN UR STRIP-ACNC: 0.2 E.U./DL
WBC #/AREA URNS AUTO: ABNORMAL /HPF

## 2024-11-04 PROCEDURE — 81001 URINALYSIS AUTO W/SCOPE: CPT

## 2024-11-04 PROCEDURE — 82043 UR ALBUMIN QUANTITATIVE: CPT

## 2024-11-04 PROCEDURE — 82570 ASSAY OF URINE CREATININE: CPT

## 2024-11-04 PROCEDURE — 82565 ASSAY OF CREATININE: CPT

## 2024-11-04 PROCEDURE — 84156 ASSAY OF PROTEIN URINE: CPT

## 2024-11-04 PROCEDURE — 36415 COLL VENOUS BLD VENIPUNCTURE: CPT

## 2024-11-05 LAB
ALBUMIN MFR UR ELPH: <6 MG/DL
CREAT SERPL-MCNC: 1.12 MG/DL (ref 0.51–0.95)
CREAT UR-MCNC: 19.3 MG/DL
CREAT UR-MCNC: 19.3 MG/DL
EGFRCR SERPLBLD CKD-EPI 2021: 59 ML/MIN/1.73M2
MICROALBUMIN UR-MCNC: <12 MG/L
MICROALBUMIN/CREAT UR: NORMAL MG/G{CREAT}
PROT/CREAT 24H UR: NORMAL MG/G{CREAT}

## 2024-12-18 DIAGNOSIS — Z52.4 KIDNEY DONOR: Primary | ICD-10-CM

## 2025-07-23 ENCOUNTER — LAB REQUISITION (OUTPATIENT)
Dept: LAB | Facility: CLINIC | Age: 55
End: 2025-07-23
Payer: COMMERCIAL

## 2025-07-23 DIAGNOSIS — N91.2 AMENORRHEA, UNSPECIFIED: ICD-10-CM

## 2025-07-23 DIAGNOSIS — E03.9 HYPOTHYROIDISM, UNSPECIFIED: ICD-10-CM

## 2025-07-23 PROCEDURE — 84443 ASSAY THYROID STIM HORMONE: CPT | Performed by: OBSTETRICS & GYNECOLOGY

## 2025-07-23 PROCEDURE — 83001 ASSAY OF GONADOTROPIN (FSH): CPT | Performed by: OBSTETRICS & GYNECOLOGY

## 2025-07-24 LAB
FSH SERPL IRP2-ACNC: 18.1 MIU/ML
TSH SERPL DL<=0.005 MIU/L-ACNC: 1.84 UIU/ML (ref 0.3–4.2)

## 2025-08-16 ENCOUNTER — HEALTH MAINTENANCE LETTER (OUTPATIENT)
Age: 55
End: 2025-08-16

## (undated) DEVICE — LABEL MEDICATION SYSTEM 3303-P

## (undated) DEVICE — CLIP APPLIER ENDO ROTATING 10MM MED/LG ER320

## (undated) DEVICE — LINEN TOWEL PACK X5 5464

## (undated) DEVICE — SOL WATER IRRIG 1000ML BOTTLE 2F7114

## (undated) DEVICE — BASIN SET SINGLE STERILE 13752-624

## (undated) DEVICE — SHEARS HARMONIC ULTRASONIC LAP 36CM CURVE TIP HAR1136

## (undated) DEVICE — SU VICRYL 0 TIE 54" J608H

## (undated) DEVICE — ENDO TROCAR FIRST ENTRY KII FIOS Z-THRD 12X100MM CTF73

## (undated) DEVICE — SU DERMABOND ADVANCED .7ML DNX12

## (undated) DEVICE — STRAP UNIVERSAL POSITIONING 2-PIECE 4X47X76" 91-287

## (undated) DEVICE — ESU ENDO SCISSORS 5MM CVD 5DCS

## (undated) DEVICE — TUBING IRRIG CYSTO/BLADDER SET 81" LF 2C4040

## (undated) DEVICE — LINEN GOWN XLG 5407

## (undated) DEVICE — TUBING SMOKE EVAC PNEUMOCLEAR HEATED 0620050350

## (undated) DEVICE — ENDO SCOPE WARMER SEAL  C3101

## (undated) DEVICE — GLOVE BIOGEL PI MICRO SZ 7.5 48575

## (undated) DEVICE — SOL NACL 0.9% INJ 1000ML BAG 2B1324X

## (undated) DEVICE — PREP CHLORAPREP 26ML TINTED HI-LITE ORANGE 930815

## (undated) DEVICE — SU MONOCRYL 4-0 PS-2 27" UND Y426H

## (undated) DEVICE — ESU GROUND PAD ADULT W/CORD E7507

## (undated) DEVICE — SUCTION IRR STRYKERFLOW II W/TIP 250-070-520

## (undated) DEVICE — CLIP ENDO HEMO-LOC PURPLE LG 544240

## (undated) DEVICE — SUCTION MANIFOLD NEPTUNE 2 SYS 4 PORT 0702-020-000

## (undated) DEVICE — ANTIFOG SOLUTION W/FOAM PAD 31142527

## (undated) DEVICE — SU VICRYL 3-0 SH 27" UND J416H

## (undated) DEVICE — DRAPE ISOLATION BAG 1003

## (undated) DEVICE — SU SILK 3-0 TIE 12X30" A304H

## (undated) DEVICE — LINEN TOWEL PACK X6 WHITE 5487

## (undated) DEVICE — ENDO TROCAR SLEEVE KII Z-THREADED 12X100MM CTS22

## (undated) DEVICE — ESU PENCIL SMOKE EVAC W/ROCKER SWITCH 0703-047-000

## (undated) DEVICE — ADPT 5 IN 1 360

## (undated) DEVICE — SURGICEL ABSORBABLE HEMOSTAT SNOW 4"X4" 2083

## (undated) DEVICE — SU SILK 2-0 TIE 12X30" A305H

## (undated) DEVICE — Device

## (undated) DEVICE — STPL POWERED ECHELON VASC 35MM PVE35A

## (undated) DEVICE — SU SILK 4-0 TIE 12X30" A303H

## (undated) DEVICE — CATH TRAY FOLEY SURESTEP 16FR W/URNE MTR STLK LATEX A303316A

## (undated) DEVICE — BNDG ABDOMINAL BINDER 9X45-62" 79-89071

## (undated) DEVICE — SOL NACL 0.9% IRRIG 1000ML BOTTLE 2F7124

## (undated) DEVICE — ENDO GELPORT 100/120MM C8XX2

## (undated) DEVICE — SU PDS II 0 TP-1 60" Z991G

## (undated) DEVICE — DEVICE SUTURE PASSER 14GA WECK EFX EFXSP2

## (undated) DEVICE — WIPES FOLEY CARE SURESTEP PROVON DFC100

## (undated) DEVICE — DRAPE FLUID WARMING 52 X 60" ORS-321

## (undated) RX ORDER — FENTANYL CITRATE 50 UG/ML
INJECTION, SOLUTION INTRAMUSCULAR; INTRAVENOUS
Status: DISPENSED
Start: 2020-10-28

## (undated) RX ORDER — HYDROMORPHONE HYDROCHLORIDE 1 MG/ML
INJECTION, SOLUTION INTRAMUSCULAR; INTRAVENOUS; SUBCUTANEOUS
Status: DISPENSED
Start: 2023-11-15

## (undated) RX ORDER — GABAPENTIN 300 MG/1
CAPSULE ORAL
Status: DISPENSED
Start: 2023-11-15

## (undated) RX ORDER — PROPOFOL 10 MG/ML
INJECTION, EMULSION INTRAVENOUS
Status: DISPENSED
Start: 2023-11-15

## (undated) RX ORDER — FUROSEMIDE 10 MG/ML
INJECTION INTRAMUSCULAR; INTRAVENOUS
Status: DISPENSED
Start: 2023-11-15

## (undated) RX ORDER — KETOROLAC TROMETHAMINE 30 MG/ML
INJECTION, SOLUTION INTRAMUSCULAR; INTRAVENOUS
Status: DISPENSED
Start: 2023-11-15

## (undated) RX ORDER — DEXTROSE, SODIUM CHLORIDE, SODIUM LACTATE, POTASSIUM CHLORIDE, AND CALCIUM CHLORIDE 5; .6; .31; .03; .02 G/100ML; G/100ML; G/100ML; G/100ML; G/100ML
INJECTION, SOLUTION INTRAVENOUS
Status: DISPENSED
Start: 2023-11-15

## (undated) RX ORDER — CARDIOPLEG/ORGAN PRESERV NO.1 9-198-2-1
BOTTLE PERFUSION
Status: DISPENSED
Start: 2023-11-15

## (undated) RX ORDER — HEPARIN SODIUM 1000 [USP'U]/ML
INJECTION, SOLUTION INTRAVENOUS; SUBCUTANEOUS
Status: DISPENSED
Start: 2023-11-15

## (undated) RX ORDER — ACETAMINOPHEN 325 MG/1
TABLET ORAL
Status: DISPENSED
Start: 2023-11-15

## (undated) RX ORDER — FENTANYL CITRATE 50 UG/ML
INJECTION, SOLUTION INTRAMUSCULAR; INTRAVENOUS
Status: DISPENSED
Start: 2023-11-15

## (undated) RX ORDER — PROTAMINE SULFATE 10 MG/ML
INJECTION, SOLUTION INTRAVENOUS
Status: DISPENSED
Start: 2023-11-15

## (undated) RX ORDER — ONDANSETRON 2 MG/ML
INJECTION INTRAMUSCULAR; INTRAVENOUS
Status: DISPENSED
Start: 2023-11-15